# Patient Record
Sex: MALE | Race: WHITE | NOT HISPANIC OR LATINO | Employment: UNEMPLOYED | URBAN - METROPOLITAN AREA
[De-identification: names, ages, dates, MRNs, and addresses within clinical notes are randomized per-mention and may not be internally consistent; named-entity substitution may affect disease eponyms.]

---

## 2021-01-22 ENCOUNTER — APPOINTMENT (OUTPATIENT)
Dept: GENERAL RADIOLOGY | Age: 46
End: 2021-01-22
Attending: NURSE PRACTITIONER

## 2021-01-22 ENCOUNTER — HOSPITAL ENCOUNTER (EMERGENCY)
Age: 46
Discharge: HOME | End: 2021-01-23
Attending: EMERGENCY MEDICINE

## 2021-01-22 DIAGNOSIS — U07.1 COVID-19 VIRUS INFECTION: ICD-10-CM

## 2021-01-22 DIAGNOSIS — U07.1 PNEUMONIA DUE TO COVID-19 VIRUS: Primary | ICD-10-CM

## 2021-01-22 DIAGNOSIS — J12.82 PNEUMONIA DUE TO COVID-19 VIRUS: Primary | ICD-10-CM

## 2021-01-22 LAB
ALBUMIN SERPL-MCNC: 3.2 G/DL (ref 3.6–5.1)
ALBUMIN/GLOB SERPL: 0.6 {RATIO} (ref 1–2.4)
ALP SERPL-CCNC: 83 UNITS/L (ref 45–117)
ALT SERPL-CCNC: 48 UNITS/L
ANION GAP SERPL CALC-SCNC: 13 MMOL/L (ref 10–20)
AST SERPL-CCNC: 37 UNITS/L
BASOPHILS # BLD: 0.1 K/MCL (ref 0–0.3)
BASOPHILS NFR BLD: 1 %
BILIRUB SERPL-MCNC: 0.4 MG/DL (ref 0.2–1)
BUN SERPL-MCNC: 19 MG/DL (ref 6–20)
BUN/CREAT SERPL: 17 (ref 7–25)
CALCIUM SERPL-MCNC: 8.8 MG/DL (ref 8.4–10.2)
CHLORIDE SERPL-SCNC: 106 MMOL/L (ref 98–107)
CO2 SERPL-SCNC: 26 MMOL/L (ref 21–32)
CREAT SERPL-MCNC: 1.13 MG/DL (ref 0.67–1.17)
DEPRECATED RDW RBC: 40 FL (ref 39–50)
EOSINOPHIL # BLD: 0 K/MCL (ref 0–0.5)
EOSINOPHIL NFR BLD: 0 %
ERYTHROCYTE [DISTWIDTH] IN BLOOD: 12.6 % (ref 11–15)
FASTING DURATION TIME PATIENT: ABNORMAL H
GFR SERPLBLD BASED ON 1.73 SQ M-ARVRAT: 78 ML/MIN/1.73M2
GLOBULIN SER-MCNC: 5 G/DL (ref 2–4)
GLUCOSE SERPL-MCNC: 106 MG/DL (ref 65–99)
HCT VFR BLD CALC: 43.6 % (ref 39–51)
HGB BLD-MCNC: 14.9 G/DL (ref 13–17)
IMM GRANULOCYTES # BLD AUTO: 0.4 K/MCL (ref 0–0.2)
IMM GRANULOCYTES # BLD: 4 %
LYMPHOCYTES # BLD: 1.7 K/MCL (ref 1–4.8)
LYMPHOCYTES NFR BLD: 16 %
MCH RBC QN AUTO: 29.8 PG (ref 26–34)
MCHC RBC AUTO-ENTMCNC: 34.2 G/DL (ref 32–36.5)
MCV RBC AUTO: 87.2 FL (ref 78–100)
MONOCYTES # BLD: 0.6 K/MCL (ref 0.3–0.9)
MONOCYTES NFR BLD: 6 %
NEUTROPHILS # BLD: 7.6 K/MCL (ref 1.8–7.7)
NEUTROPHILS NFR BLD: 73 %
NRBC BLD MANUAL-RTO: 0 /100 WBC
PLAT MORPH BLD: NORMAL
PLATELET # BLD AUTO: 275 K/MCL (ref 140–450)
POTASSIUM SERPL-SCNC: 3.9 MMOL/L (ref 3.4–5.1)
PROT SERPL-MCNC: 8.2 G/DL (ref 6.4–8.2)
RBC # BLD: 5 MIL/MCL (ref 4.5–5.9)
RBC MORPH BLD: NORMAL
SARS-COV-2 RNA RESP QL NAA+PROBE: DETECTED
SERVICE CMNT-IMP: ABNORMAL
SODIUM SERPL-SCNC: 141 MMOL/L (ref 135–145)
WBC # BLD: 10.5 K/MCL (ref 4.2–11)

## 2021-01-22 PROCEDURE — C9803 HOPD COVID-19 SPEC COLLECT: HCPCS

## 2021-01-22 PROCEDURE — 10002807 HB RX 258: Performed by: NURSE PRACTITIONER

## 2021-01-22 PROCEDURE — 99285 EMERGENCY DEPT VISIT HI MDM: CPT

## 2021-01-22 PROCEDURE — 71045 X-RAY EXAM CHEST 1 VIEW: CPT

## 2021-01-22 PROCEDURE — 10002803 HB RX 637: Performed by: NURSE PRACTITIONER

## 2021-01-22 PROCEDURE — 85025 COMPLETE CBC W/AUTO DIFF WBC: CPT | Performed by: NURSE PRACTITIONER

## 2021-01-22 PROCEDURE — 10002800 HB RX 250 W HCPCS: Performed by: NURSE PRACTITIONER

## 2021-01-22 PROCEDURE — 10002800 HB RX 250 W HCPCS: Performed by: EMERGENCY MEDICINE

## 2021-01-22 PROCEDURE — 96361 HYDRATE IV INFUSION ADD-ON: CPT

## 2021-01-22 PROCEDURE — 10002807 HB RX 258: Performed by: EMERGENCY MEDICINE

## 2021-01-22 PROCEDURE — 96374 THER/PROPH/DIAG INJ IV PUSH: CPT

## 2021-01-22 PROCEDURE — 80053 COMPREHEN METABOLIC PANEL: CPT | Performed by: NURSE PRACTITIONER

## 2021-01-22 PROCEDURE — M0239 BAMLANIVIMAB-XXXX INFUSION: HCPCS

## 2021-01-22 PROCEDURE — U0003 INFECTIOUS AGENT DETECTION BY NUCLEIC ACID (DNA OR RNA); SEVERE ACUTE RESPIRATORY SYNDROME CORONAVIRUS 2 (SARS-COV-2) (CORONAVIRUS DISEASE [COVID-19]), AMPLIFIED PROBE TECHNIQUE, MAKING USE OF HIGH THROUGHPUT TECHNOLOGIES AS DESCRIBED BY CMS-2020-01-R: HCPCS | Performed by: NURSE PRACTITIONER

## 2021-01-22 RX ORDER — ALBUTEROL SULFATE 90 UG/1
6 AEROSOL, METERED RESPIRATORY (INHALATION) ONCE
Status: COMPLETED | OUTPATIENT
Start: 2021-01-22 | End: 2021-01-22

## 2021-01-22 RX ORDER — 0.9 % SODIUM CHLORIDE 0.9 %
2 VIAL (ML) INJECTION EVERY 12 HOURS SCHEDULED
Status: DISCONTINUED | OUTPATIENT
Start: 2021-01-22 | End: 2021-01-23 | Stop reason: HOSPADM

## 2021-01-22 RX ORDER — ACETAMINOPHEN 325 MG/1
650 TABLET ORAL EVERY 4 HOURS PRN
Qty: 20 TABLET | Refills: 0 | Status: SHIPPED
Start: 2021-01-22 | End: 2021-01-25

## 2021-01-22 RX ORDER — ONDANSETRON 4 MG/1
4 TABLET, FILM COATED ORAL EVERY 8 HOURS PRN
Qty: 10 TABLET | Refills: 0 | Status: SHIPPED
Start: 2021-01-22 | End: 2021-01-25

## 2021-01-22 RX ORDER — DEXAMETHASONE SODIUM PHOSPHATE 10 MG/ML
10 INJECTION, SOLUTION INTRAMUSCULAR; INTRAVENOUS ONCE
Status: COMPLETED | OUTPATIENT
Start: 2021-01-22 | End: 2021-01-22

## 2021-01-22 RX ORDER — ACETAMINOPHEN 325 MG/1
650 TABLET ORAL
Status: DISCONTINUED | OUTPATIENT
Start: 2021-01-22 | End: 2021-01-23 | Stop reason: HOSPADM

## 2021-01-22 RX ORDER — LOPERAMIDE HYDROCHLORIDE 2 MG/1
2-4 TABLET ORAL 4 TIMES DAILY PRN
Qty: 10 TABLET | Refills: 0 | Status: SHIPPED
Start: 2021-01-22 | End: 2021-01-24

## 2021-01-22 RX ADMIN — DEXAMETHASONE SODIUM PHOSPHATE 10 MG: 10 INJECTION, SOLUTION INTRAMUSCULAR; INTRAVENOUS at 20:20

## 2021-01-22 RX ADMIN — SODIUM CHLORIDE 700 MG: 0.9 INJECTION, SOLUTION INTRAVENOUS at 22:27

## 2021-01-22 RX ADMIN — SODIUM CHLORIDE 1000 ML: 0.9 INJECTION, SOLUTION INTRAVENOUS at 20:23

## 2021-01-22 RX ADMIN — ALBUTEROL SULFATE 6 PUFF: 90 AEROSOL, METERED RESPIRATORY (INHALATION) at 20:33

## 2021-01-22 ASSESSMENT — ENCOUNTER SYMPTOMS
PSYCHIATRIC NEGATIVE: 1
FEVER: 1
CHILLS: 1
NEUROLOGICAL NEGATIVE: 1
COUGH: 1
GASTROINTESTINAL NEGATIVE: 1
SHORTNESS OF BREATH: 1

## 2021-01-22 ASSESSMENT — PAIN SCALES - GENERAL: PAINLEVEL_OUTOF10: 0

## 2021-01-23 VITALS
HEIGHT: 70 IN | TEMPERATURE: 99.1 F | WEIGHT: 247.8 LBS | SYSTOLIC BLOOD PRESSURE: 123 MMHG | RESPIRATION RATE: 22 BRPM | HEART RATE: 81 BPM | BODY MASS INDEX: 35.48 KG/M2 | DIASTOLIC BLOOD PRESSURE: 64 MMHG | OXYGEN SATURATION: 95 %

## 2021-01-23 LAB
RAINBOW EXTRA TUBES HOLD SPECIMEN: NORMAL

## 2021-01-25 ENCOUNTER — TELEPHONE (OUTPATIENT)
Dept: SCHEDULING | Age: 46
End: 2021-01-25

## 2021-01-26 ENCOUNTER — TELEPHONE (OUTPATIENT)
Dept: SCHEDULING | Age: 46
End: 2021-01-26

## 2023-09-03 PROBLEM — F51.02 ADJUSTMENT INSOMNIA: Status: ACTIVE | Noted: 2023-09-03

## 2023-09-03 PROBLEM — M25.579 ANKLE JOINT PAIN: Status: ACTIVE | Noted: 2023-09-03

## 2023-09-03 PROBLEM — F43.23 ADJUSTMENT DISORDER WITH MIXED ANXIETY AND DEPRESSED MOOD: Status: ACTIVE | Noted: 2023-09-03

## 2023-09-03 PROBLEM — I48.0 PAROXYSMAL ATRIAL FIBRILLATION (MULTI): Status: ACTIVE | Noted: 2023-09-03

## 2023-09-03 PROBLEM — E78.00 HIGH CHOLESTEROL: Status: ACTIVE | Noted: 2019-02-04

## 2023-09-03 PROBLEM — S93.401S: Status: ACTIVE | Noted: 2023-09-03

## 2023-09-03 PROBLEM — I10 ESSENTIAL (PRIMARY) HYPERTENSION: Status: ACTIVE | Noted: 2019-02-04

## 2023-09-03 PROBLEM — G43.909 MIGRAINES: Status: ACTIVE | Noted: 2019-02-11

## 2023-09-03 PROBLEM — E78.5 HYPERLIPEMIA: Status: ACTIVE | Noted: 2023-09-03

## 2023-09-03 PROBLEM — E87.5 HYPERKALEMIA: Status: ACTIVE | Noted: 2019-02-11

## 2023-09-03 PROBLEM — S93.601A SPRAIN OF RIGHT FOOT: Status: ACTIVE | Noted: 2023-09-03

## 2023-09-03 PROBLEM — S82.63XA CLOSED FRACTURE OF LATERAL MALLEOLUS: Status: ACTIVE | Noted: 2023-09-03

## 2023-09-03 RX ORDER — HYDROCHLOROTHIAZIDE 25 MG/1
1 TABLET ORAL DAILY
COMMUNITY
Start: 2022-01-31 | End: 2024-06-04

## 2023-09-03 RX ORDER — RIVAROXABAN 20 MG/1
1 TABLET, FILM COATED ORAL DAILY
COMMUNITY
Start: 2022-06-07 | End: 2024-06-04

## 2023-09-03 RX ORDER — VERAPAMIL HYDROCHLORIDE 360 MG/1
1 CAPSULE, DELAYED RELEASE PELLETS ORAL DAILY
COMMUNITY
Start: 2014-02-05 | End: 2024-06-04

## 2023-09-03 RX ORDER — METOPROLOL SUCCINATE 25 MG/1
1 TABLET, EXTENDED RELEASE ORAL DAILY
COMMUNITY
Start: 2022-04-29 | End: 2024-06-04

## 2023-09-03 RX ORDER — AMLODIPINE BESYLATE 10 MG/1
1 TABLET ORAL DAILY
COMMUNITY
Start: 2022-04-29 | End: 2023-11-13 | Stop reason: ALTCHOICE

## 2023-10-24 ENCOUNTER — OFFICE VISIT (OUTPATIENT)
Dept: DERMATOLOGY | Facility: CLINIC | Age: 48
End: 2023-10-24
Payer: COMMERCIAL

## 2023-10-24 DIAGNOSIS — L82.1 SEBORRHEIC KERATOSIS: Primary | ICD-10-CM

## 2023-10-24 DIAGNOSIS — L57.8 DIFFUSE PHOTODAMAGE OF SKIN: ICD-10-CM

## 2023-10-24 PROCEDURE — 99203 OFFICE O/P NEW LOW 30 MIN: CPT | Performed by: STUDENT IN AN ORGANIZED HEALTH CARE EDUCATION/TRAINING PROGRAM

## 2023-10-24 NOTE — PROGRESS NOTES
Subjective     Tank Neri is a 48 y.o. male who presents for the following: Suspicious Skin Lesion (Bilat. Posterior Arms. Hx of Melanoma Maternal Grandfather.).     Review of Systems:  No other skin or systemic complaints other than what is documented elsewhere in the note.    The following portions of the chart were reviewed this encounter and updated as appropriate:          Skin Cancer History  No skin cancer on file.      Specialty Problems    None       Objective   Well appearing patient in no apparent distress; mood and affect are within normal limits.    A focused skin examination was performed. All findings within normal limits unless otherwise noted below.    Assessment/Plan   1. Seborrheic keratosis (2)  Left Upper Arm - Anterior, Right Upper Arm - Anterior  Stuck on verrucous, tan-brown papules and plaques.      Reassured, benign    2. Diffuse photodamage of skin  Head - Anterior (Face)    Over the counter use of sun screen product reviewed

## 2023-11-13 ENCOUNTER — OFFICE VISIT (OUTPATIENT)
Dept: CARDIOLOGY | Facility: CLINIC | Age: 48
End: 2023-11-13
Payer: COMMERCIAL

## 2023-11-13 VITALS
WEIGHT: 229 LBS | DIASTOLIC BLOOD PRESSURE: 119 MMHG | OXYGEN SATURATION: 95 % | HEIGHT: 72 IN | BODY MASS INDEX: 31.02 KG/M2 | HEART RATE: 84 BPM | SYSTOLIC BLOOD PRESSURE: 165 MMHG

## 2023-11-13 DIAGNOSIS — I10 ESSENTIAL (PRIMARY) HYPERTENSION: ICD-10-CM

## 2023-11-13 DIAGNOSIS — I48.0 PAROXYSMAL ATRIAL FIBRILLATION (MULTI): ICD-10-CM

## 2023-11-13 DIAGNOSIS — E78.2 MIXED HYPERLIPIDEMIA: ICD-10-CM

## 2023-11-13 DIAGNOSIS — R07.89 ATYPICAL CHEST PAIN: Primary | ICD-10-CM

## 2023-11-13 PROCEDURE — 3080F DIAST BP >= 90 MM HG: CPT | Performed by: INTERNAL MEDICINE

## 2023-11-13 PROCEDURE — 99215 OFFICE O/P EST HI 40 MIN: CPT | Performed by: INTERNAL MEDICINE

## 2023-11-13 PROCEDURE — 3077F SYST BP >= 140 MM HG: CPT | Performed by: INTERNAL MEDICINE

## 2023-11-13 PROCEDURE — 93005 ELECTROCARDIOGRAM TRACING: CPT | Performed by: INTERNAL MEDICINE

## 2023-11-13 PROCEDURE — 1036F TOBACCO NON-USER: CPT | Performed by: INTERNAL MEDICINE

## 2023-11-13 PROCEDURE — 93010 ELECTROCARDIOGRAM REPORT: CPT | Performed by: INTERNAL MEDICINE

## 2023-11-13 RX ORDER — NITROGLYCERIN 0.4 MG/1
0.4 TABLET SUBLINGUAL EVERY 5 MIN PRN
Qty: 100 TABLET | Refills: 11 | Status: SHIPPED | OUTPATIENT
Start: 2023-11-13 | End: 2024-11-12

## 2023-11-13 RX ORDER — REGADENOSON 0.08 MG/ML
0.4 INJECTION, SOLUTION INTRAVENOUS
OUTPATIENT
Start: 2023-11-13

## 2023-11-13 ASSESSMENT — ENCOUNTER SYMPTOMS
LOSS OF SENSATION IN FEET: 0
DEPRESSION: 0
OCCASIONAL FEELINGS OF UNSTEADINESS: 0

## 2023-11-13 ASSESSMENT — PAIN SCALES - GENERAL: PAINLEVEL: 0-NO PAIN

## 2023-11-13 NOTE — PROGRESS NOTES
Chief Complaint:   Follow-up (5-6 month)     History Of Present Illness:    Tank Neri is a 48 y.o. male presenting with  a pertient medical Hx notable for adjustment insomnia, dyslipidemia, hypertension, paroxysmal atrial fibrillation who presents to cardiology for follow up care.     Recall that he had been in his usual state of health. He was getting ready for work when he began to experience intermittent heart palpitations associated with this was a fogginess to his brain, he went to work, where his supervisor felt that he is unwell. EMS was summoned was noted to have hypertensive urgency  For his atrial fibrillation. He was subsequently referred to [facility] were seen in the ER. He received IV labetalol for both blood pressure and rate control. He subsequently converted to normal sinus rhythm and maintained rhythm. He was started on apixaban 5 mg twice daily. He was continued on verapamil 360 mg daily, hydrochlorothiazide 25 mg, spironolactone 25 mg. He was discharged with event monitor..      Since his discharge, he has been doing well/notes of blood pressure has been better controlled. He notes a heart palpitations has also been better controlled. He has reduced and limiting nicotine and caffeine use which she feels has been helpful for him. He voices no additional complaints     Today ( 11/18/2022) he returns for follow up. He has just returned from a family vacation in [facility]. He was noticing that his legs have had a little more irritation and burn to them. Because of symptoms, he stopped amlodipine he feels that maybe his symptoms are related to amlodipine as he thinks the pill became bigger and changed. After this he began to have some of his discomfort.      5/18/2023 -- He returns for follow up. He has not had significant heart issues since the last seen.    11/13 /2023    He returns for follow up. He has been havig some chest discomfor that can occur at rest or exertion.   He has  been intermittently compliant with his blood pressure medications.        Last Recorded Vitals:  Vitals:    11/13/23 1019   BP: (!) 165/119   Patient Position: Sitting   Pulse: 84   SpO2: 95%   Weight: 104 kg (229 lb)   Height: 1.829 m (6')       Past Medical History:  He has no past medical history on file.    Past Surgical History:  He has a past surgical history that includes Cardiac catheterization (06/25/2013) and Other surgical history (06/25/2013).      Social History:  He reports that he has quit smoking. His smoking use included cigarettes. He has never used smokeless tobacco. No history on file for alcohol use and drug use.    Family History:  No family history on file.     Allergies:  Lisinopril    Outpatient Medications:  Current Outpatient Medications   Medication Instructions    hydroCHLOROthiazide (HYDRODiuril) 25 mg tablet 1 tablet, oral, Daily    metoprolol succinate XL (Toprol-XL) 25 mg 24 hr tablet 1 tablet, oral, Daily    nitroglycerin (NITROSTAT) 0.4 mg, sublingual, Every 5 min PRN, May repeat dose every 5 minutes for up to 3 doses total.    verapamil ER (Veralan PM) 360 mg 24 hr capsule 1 capsule, oral, Daily    Xarelto 20 mg tablet 1 tablet, oral, Daily       Physical Exam:  BP (!) 165/119 (Patient Position: Sitting)   Pulse 84   Ht 1.829 m (6')   Wt 104 kg (229 lb)   SpO2 95%   BMI 31.06 kg/m²   BP (!) 165/119 (Patient Position: Sitting)   Pulse 84   Ht 1.829 m (6')   Wt 104 kg (229 lb)   SpO2 95%   BMI 31.06 kg/m²        General Appearance:  Alert, cooperative, no distress, appears stated age   Head:  Normocephalic, without obvious abnormality, atraumatic   Ears:  Normal TM's and external ear canals, both ears   Throat: Lips, mucosa, and tongue normal; teeth and gums normal   Back:   Symmetric, no curvature, ROM normal, no CVA tenderness   Lungs:   Clear to auscultation bilaterally, respirations unlabored   Chest Wall:  No tenderness or deformity   Heart:  Regular rate and rhythm,  S1, S2 normal, no murmur, rub or gallop   Abdomen:   Soft, non-tender, bowel sounds active all four quadrants,  no masses, no organomegaly   Extremities: Extremities normal, atraumatic, no cyanosis or edema   Pulses: 2+ and symmetric   Skin: Skin color, texture, turgor normal, no rashes or lesions   Lymph nodes: Cervical, supraclavicular, and axillary nodes normal   Neurologic: Normal     BC -  Lab Results   Component Value Date    WBC 6.9 04/13/2022    HGB 16.7 04/13/2022    HCT 49.6 04/13/2022    MCV 91 04/13/2022     04/13/2022       CMP -  Lab Results   Component Value Date    CALCIUM 9.8 04/13/2022    PROT 7.9 04/13/2022    ALBUMIN 4.4 04/13/2022    ALBUMIN 4.3 02/08/2019    AST 25 04/13/2022    ALT 20 04/13/2022    ALKPHOS 60 04/13/2022    BILITOT 0.8 04/13/2022       LIPID PANEL -   Lab Results   Component Value Date    CHOL 190 02/08/2019    TRIG 80 02/08/2019    HDL 47 02/08/2019    CHHDL 4.0 02/08/2019       RENAL FUNCTION PANEL -   Lab Results   Component Value Date    GLUCOSE 105 (H) 04/13/2022     04/13/2022    K 3.8 04/13/2022     04/13/2022    CO2 26 04/13/2022    ANIONGAP 14 04/13/2022    BUN 16 04/13/2022    CREATININE 1.02 04/13/2022    GFRMALE >90 04/13/2022    CALCIUM 9.8 04/13/2022    ALBUMIN 4.4 04/13/2022    ALBUMIN 4.3 02/08/2019        Lab Results   Component Value Date    BNP 13 04/13/2022    HGBA1C 5.1 02/08/2019       Last Cardiology Tests:  ECG:  In office EKG  -- Sinus rhythm 71 bpm  -- Q waves present V1 through V3     Event monitor 04/2022  Portal Profes 14 day holter monitor    rhythm sinus bradycardia to sinus tachycardia with occurrence of first-degree AV block.  Minimum heart rate 57, maximum heart rate 132, average heart rate 77  954 PVCs burden 0.07%, 70 2P SVC's burden of less than 0.01%, 16 patient reported events correlating with sinus rhythm no additional atrial fibrillation seen.         Lab review: I have personally reviewed the laboratory result(s)  "  Diagnostic review: I have personally reviewed the result(s) of the EKG and Echocardiogram .     Assessment/Plan   Problem List Items Addressed This Visit       Essential (primary) hypertension    Overview     He has documented angioedema to ACE inhibitors  He had myalgia related to use of Amlodipine-- Stopped             Current Assessment & Plan     hydrochlorothiazide 25  metoprolol succinate 25  verapamil 320         Hyperlipemia    Overview     The ASCVD Risk score (Raheem HUI, et al., 2019) failed to calculate for the following reasons:    Cannot find a previous HDL lab    Cannot find a previous total cholesterol lab  Lab Results   Component Value Date    CHOL 190 02/08/2019     Lab Results   Component Value Date    HDL 47 02/08/2019     Lab Results   Component Value Date    LDLCALC 127 02/08/2019     Lab Results   Component Value Date    TRIG 80 02/08/2019   No components found for: \"CHOLHDL\"           Current Assessment & Plan     Repeat Blood Work for risk stratification            Relevant Orders    ECG 12 lead (Clinic Performed) (Completed)    Lipid Panel    Comprehensive metabolic panel    CBC and Auto Differential    Paroxysmal atrial fibrillation (CMS/HCC)    Overview      [id number] score =1 ( HTN)   --Continue Apixaban at this time   -- Continue Verapamil / Metoprolol for Rate / Rhythm control         Relevant Medications    nitroglycerin (Nitrostat) 0.4 mg SL tablet    Other Relevant Orders    ECG 12 lead (Clinic Performed) (Completed)    Comprehensive metabolic panel    CBC and Auto Differential    Atypical chest pain - Primary    Overview     Atypical Chest pain with New Q waves in the Septal Leads  Re-stratifiy for ischemic Disease   Nuclear Perfusion Stress Test         Relevant Medications    nitroglycerin (Nitrostat) 0.4 mg SL tablet    Other Relevant Orders    ECG 12 lead (Clinic Performed) (Completed)    Lipid Panel    Nuclear Stress Test    Comprehensive metabolic panel    CBC and Auto " Differential           Charanjit Palmer, DO

## 2023-11-15 LAB
ATRIAL RATE: 84 BPM
P AXIS: 27 DEGREES
P OFFSET: 185 MS
P ONSET: 132 MS
PR INTERVAL: 180 MS
Q ONSET: 222 MS
QRS COUNT: 14 BEATS
QRS DURATION: 84 MS
QT INTERVAL: 372 MS
QTC CALCULATION(BAZETT): 439 MS
QTC FREDERICIA: 416 MS
R AXIS: -21 DEGREES
T AXIS: 63 DEGREES
T OFFSET: 408 MS
VENTRICULAR RATE: 84 BPM

## 2023-11-30 PROBLEM — R07.89 ATYPICAL CHEST PAIN: Status: ACTIVE | Noted: 2023-11-30

## 2023-12-01 ENCOUNTER — APPOINTMENT (OUTPATIENT)
Dept: CARDIOLOGY | Facility: HOSPITAL | Age: 48
End: 2023-12-01
Payer: COMMERCIAL

## 2023-12-01 ENCOUNTER — APPOINTMENT (OUTPATIENT)
Dept: RADIOLOGY | Facility: HOSPITAL | Age: 48
End: 2023-12-01
Payer: COMMERCIAL

## 2023-12-11 ENCOUNTER — APPOINTMENT (OUTPATIENT)
Dept: CARDIOLOGY | Facility: CLINIC | Age: 48
End: 2023-12-11
Payer: COMMERCIAL

## 2023-12-13 ENCOUNTER — TELEPHONE (OUTPATIENT)
Dept: CARDIOLOGY | Facility: CLINIC | Age: 48
End: 2023-12-13
Payer: COMMERCIAL

## 2023-12-13 NOTE — TELEPHONE ENCOUNTER
Patient calling regarding nuclear stress test. Patient states that his insurance company ([email or web address]) has reached out to the office twice 11/20 and 11/21 regarding a peer to peer review to get his nuclear stress test approved.    Patient also states that paperwork has been faxed over and not been received back.    Patient had to cancel first stress test and is scheduled for tomorrow, but test is still not yet approved.    Patient wants to know if paperwork has been received or has peer to peer been scheduled?

## 2023-12-15 ENCOUNTER — APPOINTMENT (OUTPATIENT)
Dept: OPHTHALMOLOGY | Facility: CLINIC | Age: 48
End: 2023-12-15
Payer: COMMERCIAL

## 2023-12-22 ENCOUNTER — APPOINTMENT (OUTPATIENT)
Dept: CARDIOLOGY | Facility: CLINIC | Age: 48
End: 2023-12-22
Payer: COMMERCIAL

## 2024-05-06 ENCOUNTER — OFFICE VISIT (OUTPATIENT)
Dept: OPHTHALMOLOGY | Facility: CLINIC | Age: 49
End: 2024-05-06
Payer: COMMERCIAL

## 2024-05-06 DIAGNOSIS — H02.9 EYELID LESION, BENIGN: ICD-10-CM

## 2024-05-06 DIAGNOSIS — H25.813 COMBINED FORMS OF AGE-RELATED CATARACT OF BOTH EYES: Primary | ICD-10-CM

## 2024-05-06 DIAGNOSIS — H52.7 REFRACTIVE ERROR: ICD-10-CM

## 2024-05-06 PROCEDURE — 99214 OFFICE O/P EST MOD 30 MIN: CPT | Performed by: OPHTHALMOLOGY

## 2024-05-06 PROCEDURE — 92015 DETERMINE REFRACTIVE STATE: CPT | Performed by: OPHTHALMOLOGY

## 2024-05-06 PROCEDURE — 99204 OFFICE O/P NEW MOD 45 MIN: CPT | Performed by: OPHTHALMOLOGY

## 2024-05-06 RX ORDER — POLYMYXIN B SULFATE AND TRIMETHOPRIM 1; 10000 MG/ML; [USP'U]/ML
SOLUTION OPHTHALMIC
COMMUNITY
Start: 2024-04-29

## 2024-05-06 ASSESSMENT — REFRACTION_WEARINGRX
OD_CYLINDER: -0.50
OS_CYLINDER: -0.25
OS_AXIS: 021
OD_AXIS: 160
OD_SPHERE: -3.00
OS_SPHERE: -3.25

## 2024-05-06 ASSESSMENT — CUP TO DISC RATIO
OD_RATIO: 0.35
OS_RATIO: 0.35

## 2024-05-06 ASSESSMENT — ENCOUNTER SYMPTOMS
OCCASIONAL FEELINGS OF UNSTEADINESS: 0
NEUROLOGICAL NEGATIVE: 0
ALLERGIC/IMMUNOLOGIC NEGATIVE: 0
PSYCHIATRIC NEGATIVE: 0
RESPIRATORY NEGATIVE: 0
LOSS OF SENSATION IN FEET: 0
EYES NEGATIVE: 0
HEMATOLOGIC/LYMPHATIC NEGATIVE: 0
DEPRESSION: 0
MUSCULOSKELETAL NEGATIVE: 0
CARDIOVASCULAR NEGATIVE: 0
CONSTITUTIONAL NEGATIVE: 0
ENDOCRINE NEGATIVE: 0
GASTROINTESTINAL NEGATIVE: 0

## 2024-05-06 ASSESSMENT — KERATOMETRY
OD_AXISANGLE2_DEGREES: 140
OD_K2POWER_DIOPTERS: 45.25
OS_AXISANGLE2_DEGREES: 30
OS_AXISANGLE_DEGREES: 120
OS_K2POWER_DIOPTERS: 45.75
OD_K1POWER_DIOPTERS: 44.25
OD_AXISANGLE_DEGREES: 50
OS_K1POWER_DIOPTERS: 44.75

## 2024-05-06 ASSESSMENT — PATIENT HEALTH QUESTIONNAIRE - PHQ9
SUM OF ALL RESPONSES TO PHQ9 QUESTIONS 1 AND 2: 0
2. FEELING DOWN, DEPRESSED OR HOPELESS: NOT AT ALL
1. LITTLE INTEREST OR PLEASURE IN DOING THINGS: NOT AT ALL

## 2024-05-06 ASSESSMENT — VISUAL ACUITY
METHOD: SNELLEN - LINEAR
OS_CC: 20/25
OD_CC: 20/25
OD_CC+: -2
OS_CC+: -2
CORRECTION_TYPE: GLASSES

## 2024-05-06 ASSESSMENT — TONOMETRY
IOP_METHOD: GOLDMANN APPLANATION
OD_IOP_MMHG: 13
OS_IOP_MMHG: 13

## 2024-05-06 ASSESSMENT — SLIT LAMP EXAM - LIDS: COMMENTS: 1+ BLEPHARITIS, 1+ DERMATOCHALASIS - UPPER LID

## 2024-05-06 ASSESSMENT — EXTERNAL EXAM - LEFT EYE: OS_EXAM: BROW PTOSIS

## 2024-05-06 ASSESSMENT — PAIN SCALES - GENERAL: PAINLEVEL: 0-NO PAIN

## 2024-05-06 ASSESSMENT — EXTERNAL EXAM - RIGHT EYE: OD_EXAM: BROW PTOSIS

## 2024-05-06 NOTE — PROGRESS NOTES
Subjective   Patient ID: Tank Neri is a 49 y.o. male.    Chief Complaint    Annual Exam       HPI    PT WAS SEEN [facility] POSSIBLE STYE.  PT USING POLYTRIM GTT left eye (OS)   Last edited by LAINA Wilson on 5/6/2024  3:21 PM.        Current Outpatient Medications (Ophthalmology pharm classes)   Medication Sig Dispense Refill    polymyxin B sulf-trimethoprim (Polytrim) ophthalmic solution INSTILL 2 DROPS IN AFFECTED EYE EVERY 6 HOURS FOR 7 DAYS       Current Outpatient Medications (Other)   Medication Sig Dispense Refill    hydroCHLOROthiazide (HYDRODiuril) 25 mg tablet Take 1 tablet (25 mg) by mouth once daily.      metoprolol succinate XL (Toprol-XL) 25 mg 24 hr tablet Take 1 tablet (25 mg) by mouth once daily.      nitroglycerin (Nitrostat) 0.4 mg SL tablet Place 1 tablet (0.4 mg) under the tongue every 5 minutes if needed for chest pain. May repeat dose every 5 minutes for up to 3 doses total. 100 tablet 11    verapamil ER (Veralan PM) 360 mg 24 hr capsule Take 1 capsule (360 mg) by mouth once daily.      Xarelto 20 mg tablet Take 1 tablet (20 mg) by mouth once daily.         Objective   Base Eye Exam       Visual Acuity (Snellen - Linear)         Right Left Both    Dist cc 20/25 -2 20/25 -2     Near cc   J1      Correction: Glasses              Tonometry (Goldmann Applanation, 3:37 PM)         Right Left    Pressure 13 13              Pupils         Dark Shape React APD    Right 5 Round 2 None    Left 5 Round 2 None              Extraocular Movement         Right Left     Full Full              Dilation       Both eyes: 1% Tropic 2.5% Phen @ 3:38 PM                  Additional Tests       Keratometry         K1 Axis K2 Axis    Right 44.25 140 45.25 50    Left 44.75 30 45.75 120                  Slit Lamp and Fundus Exam       External Exam         Right Left    External Brow ptosis Brow ptosis              Slit Lamp Exam         Right Left    Lids/Lashes 1+ Blepharitis, 1+  Dermatochalasis - upper lid 1+ Blepharitis, 1+ Dermatochalasis - upper lid; small cyst temporally on lower lid.          Conjunctiva/Sclera normal bulbar and palepbral conjunctiva normal bulbar and palepbral conjunctiva    Cornea normal epi/stroma/endo and tear film normal epi/stroma/endo and tear film    Anterior Chamber normal anterior chamber, deep and quiet normal anterior chamber, deep and quiet    Iris pupil miotic pupil miotic    Lens Trace nuclear sclerosis Trace nuclear sclerosis    Anterior Vitreous vitreous clear and normal vitreous clear and normal              Fundus Exam         Right Left    Disc normal optic nerve normal optic nerve    C/D Ratio 0.35 0.35    Macula normal macula normal macula    Vessels normal retinal vessels normal retinal vessels    Periphery normal retinal periphery normal retinal periphery                  Refraction       Wearing Rx         Sphere Cylinder Axis    Right -3.00 -0.50 160    Left -3.25 -0.25 021              Final Rx         Sphere Cylinder [place] Dist VA Add Near VA    Right -3.00 -0.50 160 20/20 +1.50 20/20    Left -3.25 -0.25 020 20/20 +1.50 20/20      Expiration Date: 5/6/2026    Pupillary Distance: 64                    Assessment/Plan   Problem List Items Addressed This Visit          Eye/Vision problems    Combined forms of age-related cataract of both eyes - Primary    Refractive error     Rx given.  F/u 2 years full.           Eyelid lesion, benign

## 2024-05-06 NOTE — PATIENT INSTRUCTIONS
Lid scrubs daily.  Artificial tears as needed.  Glasses prescription given.  Follow up in 2 years for a full exam.

## 2024-05-10 ENCOUNTER — APPOINTMENT (OUTPATIENT)
Dept: OPHTHALMOLOGY | Facility: CLINIC | Age: 49
End: 2024-05-10
Payer: COMMERCIAL

## 2024-06-02 DIAGNOSIS — I48.0 PAROXYSMAL ATRIAL FIBRILLATION (MULTI): ICD-10-CM

## 2024-06-02 DIAGNOSIS — I10 ESSENTIAL (PRIMARY) HYPERTENSION: ICD-10-CM

## 2024-06-02 DIAGNOSIS — R07.89 ATYPICAL CHEST PAIN: Primary | ICD-10-CM

## 2024-06-04 RX ORDER — VERAPAMIL HYDROCHLORIDE 360 MG/1
360 CAPSULE, DELAYED RELEASE PELLETS ORAL DAILY
Qty: 90 CAPSULE | Refills: 3 | Status: SHIPPED | OUTPATIENT
Start: 2024-06-04

## 2024-06-04 RX ORDER — HYDROCHLOROTHIAZIDE 25 MG/1
25 TABLET ORAL DAILY
Qty: 90 TABLET | Refills: 3 | Status: SHIPPED | OUTPATIENT
Start: 2024-06-04 | End: 2025-06-04

## 2024-06-04 RX ORDER — METOPROLOL SUCCINATE 25 MG/1
25 TABLET, EXTENDED RELEASE ORAL DAILY
Qty: 90 TABLET | Refills: 3 | Status: SHIPPED | OUTPATIENT
Start: 2024-06-04 | End: 2025-06-04

## 2024-07-19 ENCOUNTER — TELEPHONE (OUTPATIENT)
Dept: FAMILY MEDICINE | Age: 49
End: 2024-07-19

## 2024-07-19 ENCOUNTER — APPOINTMENT (OUTPATIENT)
Dept: FAMILY MEDICINE | Age: 49
End: 2024-07-19

## 2024-07-19 VITALS
TEMPERATURE: 98.4 F | HEART RATE: 71 BPM | OXYGEN SATURATION: 99 % | BODY MASS INDEX: 41.19 KG/M2 | DIASTOLIC BLOOD PRESSURE: 90 MMHG | WEIGHT: 287.7 LBS | HEIGHT: 70 IN | SYSTOLIC BLOOD PRESSURE: 140 MMHG

## 2024-07-19 DIAGNOSIS — E66.01 CLASS 3 SEVERE OBESITY DUE TO EXCESS CALORIES WITHOUT SERIOUS COMORBIDITY WITH BODY MASS INDEX (BMI) OF 40.0 TO 44.9 IN ADULT  (CMD): ICD-10-CM

## 2024-07-19 DIAGNOSIS — Z00.00 ENCOUNTER FOR GENERAL ADULT MEDICAL EXAMINATION W/O ABNORMAL FINDINGS: Primary | ICD-10-CM

## 2024-07-19 DIAGNOSIS — K21.9 GASTROESOPHAGEAL REFLUX DISEASE WITHOUT ESOPHAGITIS: ICD-10-CM

## 2024-07-19 DIAGNOSIS — Z76.89 ENCOUNTER TO ESTABLISH CARE WITH NEW DOCTOR: ICD-10-CM

## 2024-07-19 DIAGNOSIS — I10 ESSENTIAL HYPERTENSION: ICD-10-CM

## 2024-07-19 DIAGNOSIS — Z12.11 SCREEN FOR COLON CANCER: ICD-10-CM

## 2024-07-19 DIAGNOSIS — Z23 NEED FOR VACCINATION: ICD-10-CM

## 2024-07-19 PROBLEM — E66.813 CLASS 3 SEVERE OBESITY DUE TO EXCESS CALORIES WITHOUT SERIOUS COMORBIDITY WITH BODY MASS INDEX (BMI) OF 40.0 TO 44.9 IN ADULT (CMD): Status: ACTIVE | Noted: 2024-07-19

## 2024-07-19 RX ORDER — IRBESARTAN 75 MG/1
75 TABLET ORAL DAILY
COMMUNITY
Start: 2024-02-26 | End: 2024-07-19 | Stop reason: SDUPTHER

## 2024-07-19 RX ORDER — IRBESARTAN 75 MG/1
75 TABLET ORAL DAILY
Qty: 60 TABLET | Refills: 0 | Status: SHIPPED | OUTPATIENT
Start: 2024-07-19 | End: 2024-09-17

## 2024-07-19 RX ORDER — PANTOPRAZOLE SODIUM 40 MG/1
40 TABLET, DELAYED RELEASE ORAL DAILY
Qty: 90 TABLET | Refills: 1 | Status: SHIPPED | OUTPATIENT
Start: 2024-07-19 | End: 2025-01-15

## 2024-07-19 RX ORDER — PANTOPRAZOLE SODIUM 40 MG/1
40 TABLET, DELAYED RELEASE ORAL DAILY
COMMUNITY
Start: 2024-02-26 | End: 2024-07-19 | Stop reason: SDUPTHER

## 2024-07-19 ASSESSMENT — PATIENT HEALTH QUESTIONNAIRE - PHQ9
SUM OF ALL RESPONSES TO PHQ9 QUESTIONS 1 AND 2: 0
CLINICAL INTERPRETATION OF PHQ2 SCORE: NO FURTHER SCREENING NEEDED
1. LITTLE INTEREST OR PLEASURE IN DOING THINGS: NOT AT ALL
SUM OF ALL RESPONSES TO PHQ9 QUESTIONS 1 AND 2: 0
2. FEELING DOWN, DEPRESSED OR HOPELESS: NOT AT ALL

## 2024-07-20 LAB
ALBUMIN SERPL-MCNC: 4.1 G/DL (ref 3.6–5.1)
ALBUMIN/GLOB SERPL: 1.3 {RATIO} (ref 1–2.4)
ALP SERPL-CCNC: 76 UNITS/L (ref 45–117)
ALT SERPL-CCNC: 83 UNITS/L
ANION GAP SERPL CALC-SCNC: 15 MMOL/L (ref 7–19)
AST SERPL-CCNC: 39 UNITS/L
BASOPHILS # BLD: 0.1 K/MCL (ref 0–0.3)
BASOPHILS NFR BLD: 1 %
BILIRUB SERPL-MCNC: 0.6 MG/DL (ref 0.2–1)
BUN SERPL-MCNC: 17 MG/DL (ref 6–20)
BUN/CREAT SERPL: 16 (ref 7–25)
CALCIUM SERPL-MCNC: 9.1 MG/DL (ref 8.4–10.2)
CHLORIDE SERPL-SCNC: 108 MMOL/L (ref 97–110)
CHOLEST SERPL-MCNC: 178 MG/DL
CHOLEST/HDLC SERPL: 4.5 {RATIO}
CO2 SERPL-SCNC: 22 MMOL/L (ref 21–32)
CREAT SERPL-MCNC: 1.07 MG/DL (ref 0.67–1.17)
DEPRECATED RDW RBC: 43.2 FL (ref 39–50)
EGFRCR SERPLBLD CKD-EPI 2021: 85 ML/MIN/{1.73_M2}
EOSINOPHIL # BLD: 0.3 K/MCL (ref 0–0.5)
EOSINOPHIL NFR BLD: 3 %
ERYTHROCYTE [DISTWIDTH] IN BLOOD: 13.5 % (ref 11–15)
FASTING DURATION TIME PATIENT: 8 HOURS (ref 0–999)
GLOBULIN SER-MCNC: 3.2 G/DL (ref 2–4)
GLUCOSE SERPL-MCNC: 125 MG/DL (ref 70–99)
HBA1C MFR BLD: 5.4 % (ref 4.5–5.6)
HCT VFR BLD CALC: 43 % (ref 39–51)
HCV AB SER QL: NEGATIVE
HDLC SERPL-MCNC: 40 MG/DL
HGB BLD-MCNC: 15 G/DL (ref 13–17)
IMM GRANULOCYTES # BLD AUTO: 0.2 K/MCL (ref 0–0.2)
IMM GRANULOCYTES # BLD: 3 %
LDLC SERPL CALC-MCNC: 95 MG/DL
LYMPHOCYTES # BLD: 2 K/MCL (ref 1–4.8)
LYMPHOCYTES NFR BLD: 24 %
MCH RBC QN AUTO: 30.8 PG (ref 26–34)
MCHC RBC AUTO-ENTMCNC: 34.9 G/DL (ref 32–36.5)
MCV RBC AUTO: 88.3 FL (ref 78–100)
MONOCYTES # BLD: 0.7 K/MCL (ref 0.3–0.9)
MONOCYTES NFR BLD: 8 %
NEUTROPHILS # BLD: 5.3 K/MCL (ref 1.8–7.7)
NEUTROPHILS NFR BLD: 61 %
NONHDLC SERPL-MCNC: 138 MG/DL
NRBC BLD MANUAL-RTO: 0 /100 WBC
PLATELET # BLD AUTO: 255 K/MCL (ref 140–450)
POTASSIUM SERPL-SCNC: 4.4 MMOL/L (ref 3.4–5.1)
PROT SERPL-MCNC: 7.3 G/DL (ref 6.4–8.2)
PSA SERPL-MCNC: 1.02 NG/ML
RBC # BLD: 4.87 MIL/MCL (ref 4.5–5.9)
SODIUM SERPL-SCNC: 141 MMOL/L (ref 135–145)
TRIGL SERPL-MCNC: 213 MG/DL
TSH SERPL-ACNC: 2.21 MCUNITS/ML (ref 0.35–5)
WBC # BLD: 8.5 K/MCL (ref 4.2–11)

## 2024-07-25 ENCOUNTER — TELEPHONE (OUTPATIENT)
Dept: FAMILY MEDICINE | Age: 49
End: 2024-07-25

## 2024-07-25 DIAGNOSIS — E66.01 CLASS 3 SEVERE OBESITY DUE TO EXCESS CALORIES WITHOUT SERIOUS COMORBIDITY WITH BODY MASS INDEX (BMI) OF 40.0 TO 44.9 IN ADULT  (CMD): ICD-10-CM

## 2024-08-10 LAB — HEMOCCULT STL QL: NEGATIVE

## 2024-08-22 PROBLEM — Z76.89 ENCOUNTER TO ESTABLISH CARE WITH NEW DOCTOR: Status: RESOLVED | Noted: 2024-07-19 | Resolved: 2024-08-22

## 2024-08-23 ENCOUNTER — APPOINTMENT (OUTPATIENT)
Dept: FAMILY MEDICINE | Age: 49
End: 2024-08-23

## 2024-08-23 VITALS
OXYGEN SATURATION: 99 % | BODY MASS INDEX: 40.55 KG/M2 | DIASTOLIC BLOOD PRESSURE: 90 MMHG | HEART RATE: 74 BPM | SYSTOLIC BLOOD PRESSURE: 142 MMHG | TEMPERATURE: 97.8 F | WEIGHT: 280.09 LBS

## 2024-08-23 DIAGNOSIS — E66.01 CLASS 3 SEVERE OBESITY DUE TO EXCESS CALORIES WITHOUT SERIOUS COMORBIDITY WITH BODY MASS INDEX (BMI) OF 40.0 TO 44.9 IN ADULT  (CMD): ICD-10-CM

## 2024-08-23 DIAGNOSIS — E78.1 HYPERTRIGLYCERIDEMIA: Primary | ICD-10-CM

## 2024-08-23 DIAGNOSIS — R74.8 ELEVATED LIVER ENZYMES: ICD-10-CM

## 2024-08-23 DIAGNOSIS — R19.7 DIARRHEA, UNSPECIFIED TYPE: ICD-10-CM

## 2024-08-23 DIAGNOSIS — I49.8 SINUS ARRHYTHMIA SEEN ON ELECTROCARDIOGRAM: ICD-10-CM

## 2024-08-23 DIAGNOSIS — I10 ESSENTIAL HYPERTENSION: ICD-10-CM

## 2024-08-23 RX ORDER — IRBESARTAN 150 MG/1
150 TABLET ORAL DAILY
Qty: 90 TABLET | Refills: 1 | Status: SHIPPED | OUTPATIENT
Start: 2024-08-23 | End: 2025-02-19

## 2024-08-23 ASSESSMENT — PATIENT HEALTH QUESTIONNAIRE - PHQ9
2. FEELING DOWN, DEPRESSED OR HOPELESS: NOT AT ALL
SUM OF ALL RESPONSES TO PHQ9 QUESTIONS 1 AND 2: 0
1. LITTLE INTEREST OR PLEASURE IN DOING THINGS: NOT AT ALL
SUM OF ALL RESPONSES TO PHQ9 QUESTIONS 1 AND 2: 0
CLINICAL INTERPRETATION OF PHQ2 SCORE: NO FURTHER SCREENING NEEDED

## 2024-08-27 ENCOUNTER — HOSPITAL ENCOUNTER (OUTPATIENT)
Dept: CARDIOLOGY | Age: 49
Discharge: HOME | End: 2024-08-27

## 2024-08-27 DIAGNOSIS — I49.8 SINUS ARRHYTHMIA SEEN ON ELECTROCARDIOGRAM: ICD-10-CM

## 2024-08-27 DIAGNOSIS — I10 ESSENTIAL HYPERTENSION: ICD-10-CM

## 2024-09-08 NOTE — PATIENT INSTRUCTIONS
"It was a pleasure seeing you today. I would like to see you back in clinic in  1-2  week after stress testing. You can call my office if questions arise between now and our next visit.     Today, we talked about your atrial fibrillation and your EKG   -- We talked about your EKG changing.   .  Given the patient's risk factors and symptoms, we will obtain a stress test for further ischemic evaluation.  -- we will check labs for risk stratification   -- I have sent a prescription for as needed nitroglycerin.     Below are some Heart Health Tips that we provide to all of our patients. I hope you fing them useful.     - If you are having problems with medications, consider looking at the following websites.   --  \"[employer]\"   --  \"[employer] [facility]\"      - We are happy to supply written prescriptions if needed to allow you to obtain your medications from different pharmacies. Additionally, if you are having issues with mail order delivery, please let us know. We can send a limited supply of your medications to your local pharmacy.     -  We recommend you follow a heart healthy diet. Watch food labels and try not to eat more than 2,500 mg of sodium per day. Avoid foods high in salt like processed meats (lunch meats, parham, and sausage), processed foods (boxed dinners, canned soups), fried and fast foods. Monitor serving sizes and if the sodium per serving size is more than 200 mg, avoid those foods. If the sodium per serving size is between 100-200 mg, you can use those in limited quantities. Try to choose foods where the amount of sodium per serving size is less than 100 mg. Try to eat a diet rich in fruits and vegetables, whole grains, low fat dairy products, skinless poultry and fish, nuts, beans, non-tropical vegetable oils. Limit saturated fat, trans fat, sodium, red meats, and sugar-sweetened beverages.   Limit alcohol     -The combination of a reduced-calorie diet and increased physical activity is " recommended. Adults should aim to get at least 150 minutes of moderate physical activity per week (30 minutes of moderate physical activities at least 5 days per week). Examples of moderate physical activities include brisk walking, swimming, aerobic dancing, heavy gardening, jumping rope, bicycling 10 MPH or faster, tennis, hiking uphill or with a heavy backpack. Please let us know if you would like to learn more about your nutrition and calories and additional options including weight loss programs to help you reach your goal.     -If you smoke, stop smoking. If you stop smoking you can help get rid of a major source of stress to your heart. Smoking makes your heart rate and blood pressure go up and increases your risk or developing cardiovascular diseases and worsen symptoms associated with heart failure.     -Obtain a BP monitor and monitor your BP daily. Check it around the same time each day; at least 1 hour after taking your medications. Record your BP in a log and bring your log with you to your doctors appointment.     -F/u with your PCP as recommended.      40 40 40 40

## 2024-09-19 ENCOUNTER — TELEPHONE (OUTPATIENT)
Dept: FAMILY MEDICINE | Age: 49
End: 2024-09-19

## 2024-09-19 DIAGNOSIS — I49.8 VENTRICULAR TRIGEMINY: Primary | ICD-10-CM

## 2024-09-20 ENCOUNTER — OFFICE VISIT (OUTPATIENT)
Dept: CARDIOLOGY | Age: 49
End: 2024-09-20

## 2024-09-20 ENCOUNTER — OFFICE VISIT (OUTPATIENT)
Dept: FAMILY MEDICINE | Age: 49
End: 2024-09-20

## 2024-09-20 VITALS
HEART RATE: 73 BPM | WEIGHT: 274 LBS | HEIGHT: 70 IN | SYSTOLIC BLOOD PRESSURE: 136 MMHG | DIASTOLIC BLOOD PRESSURE: 82 MMHG | BODY MASS INDEX: 39.22 KG/M2

## 2024-09-20 VITALS
DIASTOLIC BLOOD PRESSURE: 77 MMHG | HEART RATE: 61 BPM | BODY MASS INDEX: 39.8 KG/M2 | OXYGEN SATURATION: 99 % | TEMPERATURE: 98.1 F | SYSTOLIC BLOOD PRESSURE: 132 MMHG | WEIGHT: 274.91 LBS

## 2024-09-20 DIAGNOSIS — E66.09 CLASS 2 OBESITY DUE TO EXCESS CALORIES WITHOUT SERIOUS COMORBIDITY WITH BODY MASS INDEX (BMI) OF 39.0 TO 39.9 IN ADULT: ICD-10-CM

## 2024-09-20 DIAGNOSIS — K21.9 GASTROESOPHAGEAL REFLUX DISEASE, UNSPECIFIED WHETHER ESOPHAGITIS PRESENT: ICD-10-CM

## 2024-09-20 DIAGNOSIS — I10 ESSENTIAL HYPERTENSION: ICD-10-CM

## 2024-09-20 DIAGNOSIS — I49.8 SINUS ARRHYTHMIA SEEN ON ELECTROCARDIOGRAM: Primary | ICD-10-CM

## 2024-09-20 DIAGNOSIS — R00.2 PALPITATIONS: Primary | ICD-10-CM

## 2024-09-20 PROBLEM — E66.812 CLASS 2 OBESITY DUE TO EXCESS CALORIES WITHOUT SERIOUS COMORBIDITY WITH BODY MASS INDEX (BMI) OF 39.0 TO 39.9 IN ADULT: Status: ACTIVE | Noted: 2024-07-19

## 2024-09-20 LAB
ATRIAL RATE (BPM): 73
P AXIS (DEGREES): 60
PR-INTERVAL (MSEC): 188
QRS-INTERVAL (MSEC): 90
QT-INTERVAL (MSEC): 388
QTC: 427
R AXIS (DEGREES): 94
REPORT TEXT: NORMAL
T AXIS (DEGREES): 22
VENTRICULAR RATE EKG/MIN (BPM): 73

## 2024-09-20 ASSESSMENT — PATIENT HEALTH QUESTIONNAIRE - PHQ9
CLINICAL INTERPRETATION OF PHQ2 SCORE: NO FURTHER SCREENING NEEDED
SUM OF ALL RESPONSES TO PHQ9 QUESTIONS 1 AND 2: 0
1. LITTLE INTEREST OR PLEASURE IN DOING THINGS: NOT AT ALL
2. FEELING DOWN, DEPRESSED OR HOPELESS: NOT AT ALL
1. LITTLE INTEREST OR PLEASURE IN DOING THINGS: NOT AT ALL
SUM OF ALL RESPONSES TO PHQ9 QUESTIONS 1 AND 2: 0
CLINICAL INTERPRETATION OF PHQ2 SCORE: NO FURTHER SCREENING NEEDED
SUM OF ALL RESPONSES TO PHQ9 QUESTIONS 1 AND 2: 0
2. FEELING DOWN, DEPRESSED OR HOPELESS: NOT AT ALL
SUM OF ALL RESPONSES TO PHQ9 QUESTIONS 1 AND 2: 0

## 2024-09-20 ASSESSMENT — ENCOUNTER SYMPTOMS
FEVER: 0
SHORTNESS OF BREATH: 0
COLOR CHANGE: 0
COUGH: 0
FOCAL WEAKNESS: 0
CHILLS: 0

## 2024-09-24 ENCOUNTER — APPOINTMENT (OUTPATIENT)
Dept: LAB | Age: 49
End: 2024-09-24

## 2024-09-24 DIAGNOSIS — R00.2 PALPITATIONS: ICD-10-CM

## 2024-09-24 LAB — MAGNESIUM SERPL-MCNC: 2.1 MG/DL (ref 1.7–2.4)

## 2024-09-24 PROCEDURE — 36415 COLL VENOUS BLD VENIPUNCTURE: CPT | Performed by: HOSPITALIST

## 2024-09-24 PROCEDURE — 83735 ASSAY OF MAGNESIUM: CPT | Performed by: CLINICAL MEDICAL LABORATORY

## 2024-09-25 ENCOUNTER — E-ADVICE (OUTPATIENT)
Dept: FAMILY MEDICINE | Age: 49
End: 2024-09-25

## 2024-09-25 DIAGNOSIS — E66.09 CLASS 2 OBESITY DUE TO EXCESS CALORIES WITHOUT SERIOUS COMORBIDITY WITH BODY MASS INDEX (BMI) OF 39.0 TO 39.9 IN ADULT: Primary | ICD-10-CM

## 2024-09-27 ENCOUNTER — APPOINTMENT (OUTPATIENT)
Dept: FAMILY MEDICINE | Age: 49
End: 2024-09-27

## 2024-10-18 ENCOUNTER — APPOINTMENT (OUTPATIENT)
Dept: FAMILY MEDICINE | Age: 49
End: 2024-10-18

## 2024-10-18 VITALS
SYSTOLIC BLOOD PRESSURE: 121 MMHG | WEIGHT: 265.65 LBS | DIASTOLIC BLOOD PRESSURE: 74 MMHG | TEMPERATURE: 97.9 F | OXYGEN SATURATION: 97 % | BODY MASS INDEX: 38.12 KG/M2 | HEART RATE: 62 BPM

## 2024-10-18 DIAGNOSIS — E66.812 CLASS 2 OBESITY DUE TO EXCESS CALORIES WITHOUT SERIOUS COMORBIDITY WITH BODY MASS INDEX (BMI) OF 38.0 TO 38.9 IN ADULT: ICD-10-CM

## 2024-10-18 DIAGNOSIS — K21.9 GASTROESOPHAGEAL REFLUX DISEASE WITHOUT ESOPHAGITIS: ICD-10-CM

## 2024-10-18 DIAGNOSIS — I10 ESSENTIAL HYPERTENSION: Primary | ICD-10-CM

## 2024-10-18 DIAGNOSIS — E66.09 CLASS 2 OBESITY DUE TO EXCESS CALORIES WITHOUT SERIOUS COMORBIDITY WITH BODY MASS INDEX (BMI) OF 38.0 TO 38.9 IN ADULT: ICD-10-CM

## 2024-10-18 ASSESSMENT — PATIENT HEALTH QUESTIONNAIRE - PHQ9
SUM OF ALL RESPONSES TO PHQ9 QUESTIONS 1 AND 2: 0
SUM OF ALL RESPONSES TO PHQ9 QUESTIONS 1 AND 2: 0
CLINICAL INTERPRETATION OF PHQ2 SCORE: NO FURTHER SCREENING NEEDED
1. LITTLE INTEREST OR PLEASURE IN DOING THINGS: NOT AT ALL
2. FEELING DOWN, DEPRESSED OR HOPELESS: NOT AT ALL

## 2024-10-19 DIAGNOSIS — E66.09 CLASS 2 OBESITY DUE TO EXCESS CALORIES WITHOUT SERIOUS COMORBIDITY WITH BODY MASS INDEX (BMI) OF 38.0 TO 38.9 IN ADULT: ICD-10-CM

## 2024-10-19 DIAGNOSIS — E66.812 CLASS 2 OBESITY DUE TO EXCESS CALORIES WITHOUT SERIOUS COMORBIDITY WITH BODY MASS INDEX (BMI) OF 38.0 TO 38.9 IN ADULT: ICD-10-CM

## 2024-10-22 RX ORDER — SEMAGLUTIDE 1 MG/.5ML
INJECTION, SOLUTION SUBCUTANEOUS
Qty: 2 ML | Refills: 0 | Status: SHIPPED | OUTPATIENT
Start: 2024-10-22

## 2024-10-22 RX ORDER — SEMAGLUTIDE 1 MG/.5ML
INJECTION, SOLUTION SUBCUTANEOUS
Qty: 2 ML | OUTPATIENT
Start: 2024-10-22

## 2024-10-24 ENCOUNTER — APPOINTMENT (OUTPATIENT)
Dept: CARDIOLOGY | Age: 49
End: 2024-10-24
Attending: HOSPITALIST

## 2024-10-24 DIAGNOSIS — R00.2 PALPITATIONS: ICD-10-CM

## 2024-10-24 LAB
AORTIC VALVE AREA (AVA): 0.74
AV STENOSIS SEVERITY TEXT: NORMAL
AVI LVOT PEAK GRADIENT (LVOTMG): 0.8
E WAVE DECELARATION TIME (MDT): 11.04
INTERVENTRICULAR SEPTUM IN END DIASTOLE (IVSD): 1.78
LEFT INTERNAL DIMENSION IN SYSTOLE (LVSD): 0.9
LEFT VENTRICULAR INTERNAL DIMENSION IN DIASTOLE (LVDD): 2.9
LEFT VENTRICULAR POSTERIOR WALL IN END DIASTOLE (LVPW): 4.6
LV EF: NORMAL %
MV E TISSUE VEL MED (MESV): 14.7
MV E WAVE VEL/E TISSUE VEL MED(MSR): 6.74
MV PEAK A VELOCITY (MVPAV): 195
MV PEAK E VELOCITY (MVPEV): 0.64
TV ESTIMATED RIGHT ARTERIAL PRESSURE (RAP): 11

## 2024-10-24 PROCEDURE — 93306 TTE W/DOPPLER COMPLETE: CPT | Performed by: HOSPITALIST

## 2024-11-03 ENCOUNTER — E-ADVICE (OUTPATIENT)
Dept: FAMILY MEDICINE | Age: 49
End: 2024-11-03

## 2024-11-03 SDOH — ECONOMIC STABILITY: FOOD INSECURITY: WITHIN THE PAST 12 MONTHS, THE FOOD YOU BOUGHT JUST DIDN'T LAST AND YOU DIDN'T HAVE MONEY TO GET MORE.: NEVER TRUE

## 2024-11-03 SDOH — ECONOMIC STABILITY: HOUSING INSECURITY: WHAT IS YOUR LIVING SITUATION TODAY?: I HAVE A STEADY PLACE TO LIVE

## 2024-11-03 SDOH — ECONOMIC STABILITY: GENERAL: WOULD YOU LIKE HELP WITH ANY OF THE FOLLOWING NEEDS?: I DON'T WANT HELP WITH ANY OF THESE

## 2024-11-03 SDOH — ECONOMIC STABILITY: HOUSING INSECURITY: DO YOU HAVE PROBLEMS WITH ANY OF THE FOLLOWING?: NONE OF THE ABOVE

## 2024-11-03 SDOH — ECONOMIC STABILITY: TRANSPORTATION INSECURITY
IN THE PAST 12 MONTHS, HAS LACK OF RELIABLE TRANSPORTATION KEPT YOU FROM MEDICAL APPOINTMENTS, MEETINGS, WORK OR FROM GETTING THINGS NEEDED FOR DAILY LIVING?: NO

## 2024-11-03 ASSESSMENT — SOCIAL DETERMINANTS OF HEALTH (SDOH): IN THE PAST 12 MONTHS, HAS THE ELECTRIC, GAS, OIL, OR WATER COMPANY THREATENED TO SHUT OFF SERVICE IN YOUR HOME?: NO

## 2024-11-04 ENCOUNTER — APPOINTMENT (OUTPATIENT)
Age: 49
End: 2024-11-04

## 2024-11-04 ENCOUNTER — LAB (OUTPATIENT)
Dept: LAB | Facility: LAB | Age: 49
End: 2024-11-04
Payer: COMMERCIAL

## 2024-11-04 ENCOUNTER — OFFICE VISIT (OUTPATIENT)
Dept: CARDIOLOGY | Facility: CLINIC | Age: 49
End: 2024-11-04
Payer: COMMERCIAL

## 2024-11-04 ENCOUNTER — ANCILLARY PROCEDURE (OUTPATIENT)
Dept: CARDIOLOGY | Facility: CLINIC | Age: 49
End: 2024-11-04
Payer: COMMERCIAL

## 2024-11-04 VITALS
DIASTOLIC BLOOD PRESSURE: 88 MMHG | OXYGEN SATURATION: 95 % | HEART RATE: 67 BPM | HEIGHT: 72 IN | WEIGHT: 225 LBS | BODY MASS INDEX: 30.48 KG/M2 | SYSTOLIC BLOOD PRESSURE: 146 MMHG

## 2024-11-04 VITALS
HEART RATE: 78 BPM | WEIGHT: 263.12 LBS | OXYGEN SATURATION: 95 % | DIASTOLIC BLOOD PRESSURE: 88 MMHG | HEIGHT: 70 IN | BODY MASS INDEX: 37.67 KG/M2 | SYSTOLIC BLOOD PRESSURE: 127 MMHG

## 2024-11-04 DIAGNOSIS — R29.818 SUSPECTED SLEEP APNEA: ICD-10-CM

## 2024-11-04 DIAGNOSIS — R07.89 ATYPICAL CHEST PAIN: ICD-10-CM

## 2024-11-04 DIAGNOSIS — E78.01 FAMILIAL HYPERCHOLESTEROLEMIA: ICD-10-CM

## 2024-11-04 DIAGNOSIS — E78.00 HIGH CHOLESTEROL: ICD-10-CM

## 2024-11-04 DIAGNOSIS — E66.9 OBESITY (BMI 35.0-39.9 WITHOUT COMORBIDITY): ICD-10-CM

## 2024-11-04 DIAGNOSIS — I48.0 PAROXYSMAL ATRIAL FIBRILLATION (MULTI): ICD-10-CM

## 2024-11-04 DIAGNOSIS — K21.9 GASTROESOPHAGEAL REFLUX DISEASE WITHOUT ESOPHAGITIS: Primary | ICD-10-CM

## 2024-11-04 DIAGNOSIS — I10 ESSENTIAL (PRIMARY) HYPERTENSION: ICD-10-CM

## 2024-11-04 DIAGNOSIS — R74.01 ELEVATED LIVER TRANSAMINASE LEVEL: ICD-10-CM

## 2024-11-04 DIAGNOSIS — I48.0 PAROXYSMAL ATRIAL FIBRILLATION (MULTI): Primary | ICD-10-CM

## 2024-11-04 LAB
ALBUMIN SERPL BCP-MCNC: 4.1 G/DL (ref 3.4–5)
ALP SERPL-CCNC: 58 U/L (ref 33–120)
ALT SERPL W P-5'-P-CCNC: 13 U/L (ref 10–52)
ANION GAP SERPL CALCULATED.3IONS-SCNC: 14 MMOL/L (ref 10–20)
AST SERPL W P-5'-P-CCNC: 14 U/L (ref 9–39)
BASOPHILS # BLD AUTO: 0.04 X10*3/UL (ref 0–0.1)
BASOPHILS NFR BLD AUTO: 0.6 %
BILIRUB SERPL-MCNC: 1 MG/DL (ref 0–1.2)
BUN SERPL-MCNC: 23 MG/DL (ref 6–23)
CALCIUM SERPL-MCNC: 9.5 MG/DL (ref 8.6–10.3)
CHLORIDE SERPL-SCNC: 102 MMOL/L (ref 98–107)
CHOLEST SERPL-MCNC: 213 MG/DL (ref 0–199)
CHOLEST/HDLC SERPL: 4 {RATIO}
CO2 SERPL-SCNC: 27 MMOL/L (ref 21–32)
CREAT SERPL-MCNC: 1.02 MG/DL (ref 0.5–1.3)
EGFRCR SERPLBLD CKD-EPI 2021: 90 ML/MIN/1.73M*2
EOSINOPHIL # BLD AUTO: 0.14 X10*3/UL (ref 0–0.7)
EOSINOPHIL NFR BLD AUTO: 2 %
ERYTHROCYTE [DISTWIDTH] IN BLOOD BY AUTOMATED COUNT: 11.8 % (ref 11.5–14.5)
GLUCOSE SERPL-MCNC: 90 MG/DL (ref 74–99)
HCT VFR BLD AUTO: 46.5 % (ref 41–52)
HDLC SERPL-MCNC: 52.8 MG/DL
HGB BLD-MCNC: 16.3 G/DL (ref 13.5–17.5)
IMM GRANULOCYTES # BLD AUTO: 0.03 X10*3/UL (ref 0–0.7)
IMM GRANULOCYTES NFR BLD AUTO: 0.4 % (ref 0–0.9)
LDLC SERPL CALC-MCNC: 124 MG/DL
LYMPHOCYTES # BLD AUTO: 1.91 X10*3/UL (ref 1.2–4.8)
LYMPHOCYTES NFR BLD AUTO: 27.4 %
MCH RBC QN AUTO: 32.3 PG (ref 26–34)
MCHC RBC AUTO-ENTMCNC: 35.1 G/DL (ref 32–36)
MCV RBC AUTO: 92 FL (ref 80–100)
MONOCYTES # BLD AUTO: 0.65 X10*3/UL (ref 0.1–1)
MONOCYTES NFR BLD AUTO: 9.3 %
NEUTROPHILS # BLD AUTO: 4.2 X10*3/UL (ref 1.2–7.7)
NEUTROPHILS NFR BLD AUTO: 60.3 %
NON HDL CHOLESTEROL: 160 MG/DL (ref 0–149)
NRBC BLD-RTO: 0 /100 WBCS (ref 0–0)
PLATELET # BLD AUTO: 264 X10*3/UL (ref 150–450)
POTASSIUM SERPL-SCNC: 4 MMOL/L (ref 3.5–5.3)
PROT SERPL-MCNC: 7.3 G/DL (ref 6.4–8.2)
RBC # BLD AUTO: 5.05 X10*6/UL (ref 4.5–5.9)
SODIUM SERPL-SCNC: 139 MMOL/L (ref 136–145)
TRIGL SERPL-MCNC: 180 MG/DL (ref 0–149)
TSH SERPL-ACNC: 1.61 MIU/L (ref 0.44–3.98)
VLDL: 36 MG/DL (ref 0–40)
WBC # BLD AUTO: 7 X10*3/UL (ref 4.4–11.3)

## 2024-11-04 PROCEDURE — 36415 COLL VENOUS BLD VENIPUNCTURE: CPT

## 2024-11-04 PROCEDURE — 84443 ASSAY THYROID STIM HORMONE: CPT

## 2024-11-04 PROCEDURE — 85025 COMPLETE CBC W/AUTO DIFF WBC: CPT

## 2024-11-04 PROCEDURE — 99214 OFFICE O/P EST MOD 30 MIN: CPT | Performed by: INTERNAL MEDICINE

## 2024-11-04 PROCEDURE — 3079F DIAST BP 80-89 MM HG: CPT | Performed by: INTERNAL MEDICINE

## 2024-11-04 PROCEDURE — 93005 ELECTROCARDIOGRAM TRACING: CPT | Performed by: INTERNAL MEDICINE

## 2024-11-04 PROCEDURE — 80061 LIPID PANEL: CPT

## 2024-11-04 PROCEDURE — 3077F SYST BP >= 140 MM HG: CPT | Performed by: INTERNAL MEDICINE

## 2024-11-04 PROCEDURE — 99204 OFFICE O/P NEW MOD 45 MIN: CPT | Performed by: INTERNAL MEDICINE

## 2024-11-04 PROCEDURE — G2211 COMPLEX E/M VISIT ADD ON: HCPCS | Performed by: INTERNAL MEDICINE

## 2024-11-04 PROCEDURE — 3074F SYST BP LT 130 MM HG: CPT | Performed by: INTERNAL MEDICINE

## 2024-11-04 PROCEDURE — 93005 ELECTROCARDIOGRAM TRACING: CPT

## 2024-11-04 PROCEDURE — 3008F BODY MASS INDEX DOCD: CPT | Performed by: INTERNAL MEDICINE

## 2024-11-04 PROCEDURE — 80053 COMPREHEN METABOLIC PANEL: CPT

## 2024-11-04 PROCEDURE — 82172 ASSAY OF APOLIPOPROTEIN: CPT

## 2024-11-04 RX ORDER — HYDROCHLOROTHIAZIDE 25 MG/1
25 TABLET ORAL DAILY
Qty: 90 TABLET | Refills: 3 | Status: SHIPPED | OUTPATIENT
Start: 2024-11-04 | End: 2025-11-04

## 2024-11-04 RX ORDER — METOPROLOL SUCCINATE 25 MG/1
25 TABLET, EXTENDED RELEASE ORAL DAILY
Qty: 90 TABLET | Refills: 3 | Status: SHIPPED | OUTPATIENT
Start: 2024-11-04 | End: 2025-11-04

## 2024-11-04 RX ORDER — VERAPAMIL HYDROCHLORIDE 360 MG/1
360 CAPSULE, DELAYED RELEASE PELLETS ORAL DAILY
Qty: 90 CAPSULE | Refills: 3 | Status: SHIPPED | OUTPATIENT
Start: 2024-11-04 | End: 2025-11-04

## 2024-11-04 ASSESSMENT — PAIN SCALES - GENERAL: PAINLEVEL_OUTOF10: 0-NO PAIN

## 2024-11-04 NOTE — ASSESSMENT & PLAN NOTE
In Office  -- Initially 174/ 101 that reduces to 146/98 at the end of the appointment.  This still been difficult historically to control  -- We will obtain a transthoracic echocardiogram for structural evaluation including ejection fraction, assessment of regional wall motion abnormalities or valvular disease, and further evaluation of hemodynamics.  -- May benefir from ARB, especially if echocardiogram shows LVH  -- May benefit from more effective diuretic if RVSP or IVC elevated / Dilated

## 2024-11-04 NOTE — PROGRESS NOTES
Chief Complaint:   Hypertension, Atrial Fibrillation, and Follow-up (1 year)     History Of Present Illness:    Jacinto Neri is a 49 y.o. male presenting with  a pertient medical Hx notable for adjustment insomnia, dyslipidemia, hypertension, paroxysmal atrial fibrillation who presents to cardiology for follow up care.     Recall that he had been in his usual state of health. He was getting ready for work when he began to experience intermittent heart palpitations associated with this was a fogginess to his brain, he went to work, where his supervisor felt that he is unwell. EMS was summoned was noted to have hypertensive urgency  For his atrial fibrillation. He was subsequently referred to [facility] were seen in the ER. He received IV labetalol for both blood pressure and rate control. He subsequently converted to normal sinus rhythm and maintained rhythm. He was started on apixaban 5 mg twice daily. He was continued on verapamil 360 mg daily, hydrochlorothiazide 25 mg, spironolactone 25 mg. He was discharged with event monitor..      Since his discharge, he has been doing well/notes of blood pressure has been better controlled. He notes a heart palpitations has also been better controlled. He has reduced and limiting nicotine and caffeine use which she feels has been helpful for him. He voices no additional complaints     Today ( 11/18/2022) he returns for follow up. He has just returned from a family vacation in [facility]. He was noticing that his legs have had a little more irritation and burn to them. Because of symptoms, he stopped amlodipine he feels that maybe his symptoms are related to amlodipine as he thinks the pill became bigger and changed. After this he began to have some of his discomfort.      5/18/2023 -- He returns for follow up. He has not had significant heart issues since the last seen.    11/13 /2023  - He returns for follow up. He has been havig some chest discomfor that can  occur at rest or exertion.  He has been intermittently compliant with his blood pressure medications.     11/4/2024 -- He returns for follow up. It has been a year since he was last seen.  Notes that he has been keeping busy. He has not had any protracted heart palpitations in the recent brandon e and when he gets them, only fleeting at times. He notes that his blood pressures have been higher than normal He has also had some difficulty sleeping,        Last Recorded Vitals:  Vitals:    11/04/24 0947   BP: 146/88   BP Location: Right arm   Patient Position: Sitting   Pulse: 67   SpO2: 95%   Weight: 102 kg (225 lb)   Height: 1.829 m (6')         Past Medical History:  He has a past medical history of Blepharitis of left upper eyelid, Blepharitis of right upper eyelid, and Refractive error.    Past Surgical History:  He has a past surgical history that includes Cardiac catheterization (06/25/2013) and Other surgical history (06/25/2013).      Social History:  He reports that he has quit smoking. His smoking use included cigarettes. He has never used smokeless tobacco. He reports that he does not currently use alcohol. He reports that he does not use drugs.    Family History:  No family history on file.     Allergies:  Lisinopril    Outpatient Medications:  Current Outpatient Medications   Medication Instructions    hydroCHLOROthiazide (HYDRODIURIL) 25 mg, oral, Daily, Needs appointment by 11/2024    metoprolol succinate XL (TOPROL-XL) 25 mg, oral, Daily, Needs appointment by 11/2024    nitroglycerin (NITROSTAT) 0.4 mg, sublingual, Every 5 min PRN, May repeat dose every 5 minutes for up to 3 doses total.    rivaroxaban (XARELTO) 20 mg, oral, Daily, Needs appointment by 11/2024    verapamil ER (VERALAN PM) 360 mg, oral, Daily, Needs appointment by 11/2024       Physical Exam:  /88 (BP Location: Right arm, Patient Position: Sitting)   Pulse 67   Ht 1.829 m (6')   Wt 102 kg (225 lb)   SpO2 95%   BMI 30.52 kg/m²    /88 (BP Location: Right arm, Patient Position: Sitting)   Pulse 67   Ht 1.829 m (6')   Wt 102 kg (225 lb)   SpO2 95%   BMI 30.52 kg/m²   General:  Patient is awake, alert, and oriented.  Patient is in no acute distress.  HEENT:  Pupils equal and reactive.  Normocephalic.  Moist mucosa.    Neck:  No thyromegaly.  Normal Jugular Venous Pressure.  Cardiovascular:  Regular rate and rhythm.  Normal S1 and S2.  1/6 TODD.  Pulmonary:  Clear to auscultation bilaterally.  Abdomen:  Soft. Non-tender.   Non-distended.  Positive bowel sounds.  Lower Extremities:  2+ pedal pulses. No LE edema.  Neurologic:  Cranial nerves intact.  No focal deficit.   Skin: Skin warm and dry, normal skin turgor.   Psychiatric: Normal affect.            BC -  Lab Results   Component Value Date    WBC 6.9 04/13/2022    HGB 16.7 04/13/2022    HCT 49.6 04/13/2022    MCV 91 04/13/2022     04/13/2022       CMP -  Lab Results   Component Value Date    CALCIUM 9.8 04/13/2022    PROT 7.9 04/13/2022    ALBUMIN 4.4 04/13/2022    ALBUMIN 4.3 02/08/2019    AST 25 04/13/2022    ALT 20 04/13/2022    ALKPHOS 60 04/13/2022    BILITOT 0.8 04/13/2022       LIPID PANEL -   Lab Results   Component Value Date    CHOL 190 02/08/2019    TRIG 80 02/08/2019    HDL 47 02/08/2019    CHHDL 4.0 02/08/2019       RENAL FUNCTION PANEL -   Lab Results   Component Value Date    GLUCOSE 105 (H) 04/13/2022     04/13/2022    K 3.8 04/13/2022     04/13/2022    CO2 26 04/13/2022    ANIONGAP 14 04/13/2022    BUN 16 04/13/2022    CREATININE 1.02 04/13/2022    GFRMALE >90 04/13/2022    CALCIUM 9.8 04/13/2022    ALBUMIN 4.4 04/13/2022    ALBUMIN 4.3 02/08/2019        Lab Results   Component Value Date    BNP 13 04/13/2022    HGBA1C 5.1 02/08/2019       Last Cardiology Tests:  ECG:  In office EKG  -- Sinus rhythm 71 bpm  -- Q waves present V1 through V3     Event monitor 04/2022  Primo1D 14 day holter monitor    rhythm sinus bradycardia to sinus  "tachycardia with occurrence of first-degree AV block.  Minimum heart rate 57, maximum heart rate 132, average heart rate 77  954 PVCs burden 0.07%, 70 2P SVC's burden of less than 0.01%, 16 patient reported events correlating with sinus rhythm no additional atrial fibrillation seen.         Lab review: I have personally reviewed the laboratory result(s)   Diagnostic review: I have personally reviewed the result(s) of the EKG and Echocardiogram .   Reports very poor sleep.  Assessment/Plan   Problem List Items Addressed This Visit       Atypical chest pain    Overview     Atypical Chest pain with New Q waves in the Septal Leads  Re-stratifiy for ischemic Disease   Nuclear Perfusion Stress Test         Essential (primary) hypertension    Overview     He has documented angioedema to ACE inhibitors  He had myalgia related to use of Amlodipine-- Stopped             Current Assessment & Plan     In Office  -- Initially 174/ 101 that reduces to 146/98 at the end of the appointment.  This still been difficult historically to control  -- We will obtain a transthoracic echocardiogram for structural evaluation including ejection fraction, assessment of regional wall motion abnormalities or valvular disease, and further evaluation of hemodynamics.  -- May benefir from ARB, especially if echocardiogram shows LVH  -- May benefit from more effective diuretic if RVSP or IVC elevated / Dilated          High cholesterol    Overview     The ASCVD Risk score (Raheem HUI, et al., 2019) failed to calculate for the following reasons:    Cannot find a previous HDL lab    Cannot find a previous total cholesterol lab  Lab Results   Component Value Date    CHOL 190 02/08/2019     Lab Results   Component Value Date    HDL 47 02/08/2019     Lab Results   Component Value Date    LDLCALC 127 02/08/2019     Lab Results   Component Value Date    TRIG 80 02/08/2019     No components found for: \"CHOLHDL\"           Hyperlipemia    Overview     The " "ASCVD Risk score (Raheem HUI, et al., 2019) failed to calculate for the following reasons:    Risk score cannot be calculated because patient has a medical history suggesting prior/existing ASCVD  Lab Results   Component Value Date    CHOL 190 02/08/2019     Lab Results   Component Value Date    HDL 47 02/08/2019     Lab Results   Component Value Date    LDLCALC 127 02/08/2019     Lab Results   Component Value Date    TRIG 80 02/08/2019     No components found for: \"CHOLHDL\"           Paroxysmal atrial fibrillation (Multi) - Primary    Overview     Initial Event 04/2022   [id number] score =1 ( HTN)   --Continue Apixaban at this time   -- Continue Verapamil / Metoprolol for Rate / Rhythm control             Charanjit Palmer DO   Division of Cardiovascular Medicine  [facility] [place]        "

## 2024-11-06 LAB — LPA SERPL-MCNC: <6 MG/DL

## 2024-11-08 ENCOUNTER — APPOINTMENT (OUTPATIENT)
Dept: FAMILY MEDICINE | Age: 49
End: 2024-11-08

## 2024-11-08 VITALS
TEMPERATURE: 97.4 F | BODY MASS INDEX: 37.6 KG/M2 | OXYGEN SATURATION: 97 % | SYSTOLIC BLOOD PRESSURE: 123 MMHG | WEIGHT: 262.02 LBS | HEART RATE: 82 BPM | DIASTOLIC BLOOD PRESSURE: 73 MMHG

## 2024-11-08 DIAGNOSIS — Z28.21 IMMUNIZATION DECLINED: ICD-10-CM

## 2024-11-08 DIAGNOSIS — M10.9 GOUT OF BIG TOE: ICD-10-CM

## 2024-11-08 DIAGNOSIS — K21.9 GASTROESOPHAGEAL REFLUX DISEASE WITHOUT ESOPHAGITIS: ICD-10-CM

## 2024-11-08 DIAGNOSIS — R74.8 ELEVATED LIVER ENZYMES: ICD-10-CM

## 2024-11-08 DIAGNOSIS — E66.09 CLASS 2 OBESITY DUE TO EXCESS CALORIES WITHOUT SERIOUS COMORBIDITY WITH BODY MASS INDEX (BMI) OF 37.0 TO 37.9 IN ADULT: Primary | ICD-10-CM

## 2024-11-08 DIAGNOSIS — E66.812 CLASS 2 OBESITY DUE TO EXCESS CALORIES WITHOUT SERIOUS COMORBIDITY WITH BODY MASS INDEX (BMI) OF 37.0 TO 37.9 IN ADULT: Primary | ICD-10-CM

## 2024-11-08 ASSESSMENT — PATIENT HEALTH QUESTIONNAIRE - PHQ9
1. LITTLE INTEREST OR PLEASURE IN DOING THINGS: NOT AT ALL
SUM OF ALL RESPONSES TO PHQ9 QUESTIONS 1 AND 2: 0
SUM OF ALL RESPONSES TO PHQ9 QUESTIONS 1 AND 2: 0
CLINICAL INTERPRETATION OF PHQ2 SCORE: NO FURTHER SCREENING NEEDED
2. FEELING DOWN, DEPRESSED OR HOPELESS: NOT AT ALL

## 2024-11-09 LAB
ALBUMIN SERPL-MCNC: 4.1 G/DL (ref 3.4–5)
ALBUMIN/GLOB SERPL: 1.2 {RATIO} (ref 1–2.4)
ALP SERPL-CCNC: 79 UNITS/L (ref 45–117)
ALT SERPL-CCNC: 54 UNITS/L
ANION GAP SERPL CALC-SCNC: 10 MMOL/L (ref 7–19)
ANNOTATION COMMENT IMP: NORMAL
AST SERPL-CCNC: 34 UNITS/L
BILIRUB SERPL-MCNC: 0.7 MG/DL (ref 0.2–1)
BUN SERPL-MCNC: 20 MG/DL (ref 6–20)
BUN/CREAT SERPL: 20 (ref 7–25)
CALCIUM SERPL-MCNC: 9.5 MG/DL (ref 8.4–10.2)
CHLORIDE SERPL-SCNC: 108 MMOL/L (ref 97–110)
CO2 SERPL-SCNC: 23 MMOL/L (ref 21–32)
CREAT SERPL-MCNC: 1 MG/DL (ref 0.67–1.17)
EGFRCR SERPLBLD CKD-EPI 2021: >90 ML/MIN/{1.73_M2}
FASTING DURATION TIME PATIENT: NORMAL H
GLOBULIN SER-MCNC: 3.4 G/DL (ref 2–4)
GLUCOSE SERPL-MCNC: 95 MG/DL (ref 70–99)
HBV CORE IGG+IGM SER QL: NEGATIVE
HBV SURFACE AB SER QL: NEGATIVE
HBV SURFACE AG SER QL: NEGATIVE
POTASSIUM SERPL-SCNC: 4.3 MMOL/L (ref 3.4–5.1)
PROT SERPL-MCNC: 7.5 G/DL (ref 6.4–8.2)
SODIUM SERPL-SCNC: 137 MMOL/L (ref 135–145)

## 2024-11-13 LAB
ATRIAL RATE: 67 BPM
P AXIS: 25 DEGREES
P OFFSET: 187 MS
P ONSET: 131 MS
PR INTERVAL: 182 MS
Q ONSET: 222 MS
QRS COUNT: 11 BEATS
QRS DURATION: 88 MS
QT INTERVAL: 384 MS
QTC CALCULATION(BAZETT): 405 MS
QTC FREDERICIA: 398 MS
R AXIS: -21 DEGREES
T AXIS: 83 DEGREES
T OFFSET: 414 MS
VENTRICULAR RATE: 67 BPM

## 2024-11-15 ENCOUNTER — ANCILLARY PROCEDURE (OUTPATIENT)
Dept: CARDIOLOGY | Facility: CLINIC | Age: 49
End: 2024-11-15
Payer: COMMERCIAL

## 2024-11-15 DIAGNOSIS — I48.0 PAROXYSMAL ATRIAL FIBRILLATION (MULTI): ICD-10-CM

## 2024-11-15 DIAGNOSIS — I10 ESSENTIAL (PRIMARY) HYPERTENSION: ICD-10-CM

## 2024-11-15 DIAGNOSIS — R29.818 SUSPECTED SLEEP APNEA: ICD-10-CM

## 2024-11-15 DIAGNOSIS — R07.89 ATYPICAL CHEST PAIN: ICD-10-CM

## 2024-11-15 PROCEDURE — 93306 TTE W/DOPPLER COMPLETE: CPT

## 2024-11-15 PROCEDURE — 93306 TTE W/DOPPLER COMPLETE: CPT | Performed by: INTERNAL MEDICINE

## 2024-11-18 LAB
AORTIC VALVE MEAN GRADIENT: 3 MMHG
AORTIC VALVE PEAK VELOCITY: 1.32 M/S
AV PEAK GRADIENT: 7 MMHG
AVA (PEAK VEL): 3.08 CM2
AVA (VTI): 3.43 CM2
EJECTION FRACTION APICAL 4 CHAMBER: 54
EJECTION FRACTION: 57 %
LEFT ATRIUM VOLUME AREA LENGTH INDEX BSA: 28.8 ML/M2
LEFT VENTRICLE INTERNAL DIMENSION DIASTOLE: 4.62 CM (ref 3.5–6)
LEFT VENTRICULAR OUTFLOW TRACT DIAMETER: 2.4 CM
MITRAL VALVE E/A RATIO: 1.24
RIGHT VENTRICLE FREE WALL PEAK S': 8 CM/S
RIGHT VENTRICLE PEAK SYSTOLIC PRESSURE: 25 MMHG
TRICUSPID ANNULAR PLANE SYSTOLIC EXCURSION: 2.3 CM

## 2024-11-22 ENCOUNTER — APPOINTMENT (OUTPATIENT)
Dept: CARDIOLOGY | Facility: CLINIC | Age: 49
End: 2024-11-22
Payer: COMMERCIAL

## 2024-11-22 VITALS
HEART RATE: 66 BPM | WEIGHT: 231 LBS | BODY MASS INDEX: 31.29 KG/M2 | HEIGHT: 72 IN | OXYGEN SATURATION: 96 % | DIASTOLIC BLOOD PRESSURE: 95 MMHG | SYSTOLIC BLOOD PRESSURE: 158 MMHG | RESPIRATION RATE: 18 BRPM

## 2024-11-22 DIAGNOSIS — I48.0 PAROXYSMAL ATRIAL FIBRILLATION (MULTI): ICD-10-CM

## 2024-11-22 DIAGNOSIS — E78.01 FAMILIAL HYPERCHOLESTEROLEMIA: ICD-10-CM

## 2024-11-22 DIAGNOSIS — T46.4X5S ANGIOTENSIN CONVERTING ENZYME INHIBITOR-AGGRAVATED ANGIOEDEMA, SEQUELA: ICD-10-CM

## 2024-11-22 DIAGNOSIS — E78.00 HIGH CHOLESTEROL: ICD-10-CM

## 2024-11-22 DIAGNOSIS — I10 ESSENTIAL (PRIMARY) HYPERTENSION: Primary | ICD-10-CM

## 2024-11-22 DIAGNOSIS — T78.3XXS ANGIOTENSIN CONVERTING ENZYME INHIBITOR-AGGRAVATED ANGIOEDEMA, SEQUELA: ICD-10-CM

## 2024-11-22 PROCEDURE — 93010 ELECTROCARDIOGRAM REPORT: CPT | Performed by: INTERNAL MEDICINE

## 2024-11-22 PROCEDURE — 3080F DIAST BP >= 90 MM HG: CPT | Performed by: INTERNAL MEDICINE

## 2024-11-22 PROCEDURE — 99214 OFFICE O/P EST MOD 30 MIN: CPT | Performed by: INTERNAL MEDICINE

## 2024-11-22 PROCEDURE — 3077F SYST BP >= 140 MM HG: CPT | Performed by: INTERNAL MEDICINE

## 2024-11-22 PROCEDURE — 99214 OFFICE O/P EST MOD 30 MIN: CPT | Mod: 25 | Performed by: INTERNAL MEDICINE

## 2024-11-22 PROCEDURE — 3008F BODY MASS INDEX DOCD: CPT | Performed by: INTERNAL MEDICINE

## 2024-11-22 RX ORDER — OLMESARTAN MEDOXOMIL 5 MG/1
10 TABLET ORAL DAILY
Qty: 60 TABLET | Refills: 11 | Status: CANCELLED | OUTPATIENT
Start: 2024-11-22 | End: 2025-11-22

## 2024-11-22 ASSESSMENT — ENCOUNTER SYMPTOMS: DEPRESSION: 0

## 2024-11-22 NOTE — PATIENT INSTRUCTIONS
"It was a pleasure seeing you today. I would like to see you back in clinic in April. You can call my office if questions arise between now and our next visit.     Today, we talked about your atrial fibrillation and your EKG   --So Far, you have remained without evidence of Atrial Fibrillation     -- Your Blood pressure remains elevated as does your cholesterol levels     -- I would like to try you on an medication called Olmesartan. It is a cousin to Lisinopril but is not as associated with Angioedema ( swelling of the face ) as lisinopril. There biggest risk of having a repeated occurrence of Angioedema was using medications within the first several months of the lisinopril.     We will give you until April to really work on your diet.   -- Otherwise, we need to start cholesterol medications.     Below are some Heart Health Tips that we provide to all of our patients. I hope you fing them useful.     - If you are having problems with medications, consider looking at the following websites.   --  \"[employer]\"   --  \"[facility]\"      - We are happy to supply written prescriptions if needed to allow you to obtain your medications from different pharmacies. Additionally, if you are having issues with mail order delivery, please let us know. We can send a limited supply of your medications to your local pharmacy.     -  We recommend you follow a heart healthy diet. Watch food labels and try not to eat more than 2,500 mg of sodium per day. Avoid foods high in salt like processed meats (lunch meats, parham, and sausage), processed foods (boxed dinners, canned soups), fried and fast foods. Monitor serving sizes and if the sodium per serving size is more than 200 mg, avoid those foods. If the sodium per serving size is between 100-200 mg, you can use those in limited quantities. Try to choose foods where the amount of sodium per serving size is less than 100 mg. Try to eat a diet rich in fruits and " vegetables, whole grains, low fat dairy products, skinless poultry and fish, nuts, beans, non-tropical vegetable oils. Limit saturated fat, trans fat, sodium, red meats, and sugar-sweetened beverages.   Limit alcohol     -The combination of a reduced-calorie diet and increased physical activity is recommended. Adults should aim to get at least 150 minutes of moderate physical activity per week (30 minutes of moderate physical activities at least 5 days per week). Examples of moderate physical activities include brisk walking, swimming, aerobic dancing, heavy gardening, jumping rope, bicycling 10 MPH or faster, tennis, hiking uphill or with a heavy backpack. Please let us know if you would like to learn more about your nutrition and calories and additional options including weight loss programs to help you reach your goal.     -If you smoke, stop smoking. If you stop smoking you can help get rid of a major source of stress to your heart. Smoking makes your heart rate and blood pressure go up and increases your risk or developing cardiovascular diseases and worsen symptoms associated with heart failure.     -Obtain a BP monitor and monitor your BP daily. Check it around the same time each day; at least 1 hour after taking your medications. Record your BP in a log and bring your log with you to your doctors appointment.     -F/u with your PCP as recommended.

## 2024-11-22 NOTE — PROGRESS NOTES
Chief Complaint:   Follow-up and Hypertension     History Of Present Illness:    Jacinto Neri is a 49 y.o. male presenting with  a pertient medical Hx notable for adjustment insomnia, dyslipidemia, hypertension, paroxysmal atrial fibrillation who presents to cardiology for follow up care.     Recall that he had been in his usual state of health. He was getting ready for work when he began to experience intermittent heart palpitations associated with this was a fogginess to his brain, he went to work, where his supervisor felt that he is unwell. EMS was summoned was noted to have hypertensive urgency  For his atrial fibrillation. He was subsequently referred to [facility] were seen in the ER. He received IV labetalol for both blood pressure and rate control. He subsequently converted to normal sinus rhythm and maintained rhythm. He was started on apixaban 5 mg twice daily. He was continued on verapamil 360 mg daily, hydrochlorothiazide 25 mg, spironolactone 25 mg. He was discharged with event monitor..      Since his discharge, he has been doing well/notes of blood pressure has been better controlled. He notes a heart palpitations has also been better controlled. He has reduced and limiting nicotine and caffeine use which she feels has been helpful for him. He voices no additional complaints     Today ( 11/18/2022) he returns for follow up. He has just returned from a family vacation in [facility]. He was noticing that his legs have had a little more irritation and burn to them. Because of symptoms, he stopped amlodipine he feels that maybe his symptoms are related to amlodipine as he thinks the pill became bigger and changed. After this he began to have some of his discomfort.      5/18/2023 -- He returns for follow up. He has not had significant heart issues since the last seen.    11/13 /2023  - He returns for follow up. He has been havig some chest discomfor that can occur at rest or exertion.  He  has been intermittently compliant with his blood pressure medications.     11/4/2024 -- He returns for follow up. It has been a year since he was last seen.  Notes that he has been keeping busy. He has not had any protracted heart palpitations in the recent brandon e and when he gets them, only fleeting at times. He notes that his blood pressures have been higher than normal He has also had some difficulty sleeping,     11/22/2024 --he returns for follow-up.  He notes that his blood pressure continues to be elevated.  He has been trying diet, exercise.  He also notes that his cholesterol level has increased as well.  He completed his echocardiogram and returns for the results. He was also able to complete the testing ordered.       No exacerbating or relieving factors.  Patient denies chest pain and angina.  Pt denies shortness of breath, dyspnea on exertion, orthopnea, and paroxysmal nocturnal dyspnea.  Pt denies worsening lower extremity edema.  Pt denies palpitations or syncope.  No recent falls.  No fever or chills.  No cough.  No change in bowel or bladder habits.  No sick contacts.  No recent travel.       Last Recorded Vitals:  Vitals:    11/22/24 1009   BP: (!) 158/95   BP Location: Right arm   Patient Position: Sitting   BP Cuff Size: Large adult   Pulse: 66   Resp: 18   SpO2: 96%   Weight: 105 kg (231 lb)   Height: 1.829 m (6')           Past Medical History:  He has a past medical history of Blepharitis of left upper eyelid, Blepharitis of right upper eyelid, and Refractive error.    Past Surgical History:  He has a past surgical history that includes Cardiac catheterization (06/25/2013) and Other surgical history (06/25/2013).      Social History:  He reports that he has quit smoking. His smoking use included cigarettes. His smokeless tobacco use includes chew. He reports current alcohol use. He reports that he does not use drugs.    Family History:  No family history on file.      Allergies:  Lisinopril    Outpatient Medications:  Current Outpatient Medications   Medication Instructions    hydroCHLOROthiazide (HYDRODIURIL) 25 mg, oral, Daily    metoprolol succinate XL (TOPROL-XL) 25 mg, oral, Daily    nitroglycerin (NITROSTAT) 0.4 mg, sublingual, Every 5 min PRN, May repeat dose every 5 minutes for up to 3 doses total.    rivaroxaban (XARELTO) 20 mg, oral, Daily    verapamil ER (VERALAN PM) 360 mg, oral, Daily       Physical Exam:  BP (!) 158/95 (BP Location: Right arm, Patient Position: Sitting, BP Cuff Size: Large adult)   Pulse 66   Resp 18   Ht 1.829 m (6')   Wt 105 kg (231 lb)   SpO2 96%   BMI 31.33 kg/m²   BP (!) 158/95 (BP Location: Right arm, Patient Position: Sitting, BP Cuff Size: Large adult)   Pulse 66   Resp 18   Ht 1.829 m (6')   Wt 105 kg (231 lb)   SpO2 96%   BMI 31.33 kg/m²   General:  Patient is awake, alert, and oriented.  Patient is in no acute distress.  HEENT:  Pupils equal and reactive.  Normocephalic.  Moist mucosa.    Neck:  No thyromegaly.  Normal Jugular Venous Pressure.  Cardiovascular:  Regular rate and rhythm.  Normal S1 and S2.  1/6 TODD.  Pulmonary:  Clear to auscultation bilaterally.  Abdomen:  Soft. Non-tender.   Non-distended.  Positive bowel sounds.  Lower Extremities:  2+ pedal pulses. No LE edema.  Neurologic:  Cranial nerves intact.  No focal deficit.   Skin: Skin warm and dry, normal skin turgor.   Psychiatric: Normal affect.            BC -  Lab Results   Component Value Date    WBC 7.0 11/04/2024    HGB 16.3 11/04/2024    HCT 46.5 11/04/2024    MCV 92 11/04/2024     11/04/2024       CMP -  Lab Results   Component Value Date    CALCIUM 9.5 11/04/2024    PROT 7.3 11/04/2024    ALBUMIN 4.1 11/04/2024    AST 14 11/04/2024    ALT 13 11/04/2024    ALKPHOS 58 11/04/2024    BILITOT 1.0 11/04/2024       LIPID PANEL -   Lab Results   Component Value Date    CHOL 213 (H) 11/04/2024    TRIG 180 (H) 11/04/2024    HDL 52.8 11/04/2024    CHHDL  4.0 11/04/2024    VLDL 36 11/04/2024    NHDL 160 (H) 11/04/2024       RENAL FUNCTION PANEL -   Lab Results   Component Value Date    GLUCOSE 90 11/04/2024     11/04/2024    K 4.0 11/04/2024     11/04/2024    CO2 27 11/04/2024    ANIONGAP 14 11/04/2024    BUN 23 11/04/2024    CREATININE 1.02 11/04/2024    GFRMALE >90 04/13/2022    CALCIUM 9.5 11/04/2024    ALBUMIN 4.1 11/04/2024        Lab Results   Component Value Date    BNP 13 04/13/2022    HGBA1C 5.1 02/08/2019       Last Cardiology Tests:  Echocardiogram 11/15/2024  CONCLUSIONS:   1. Left ventricular ejection fraction is normal, calculated by Davis's biplane at 57%.   2. There is normal right ventricular global systolic function.      In office EKG  -- Sinus rhythm 71 bpm  -- Q waves present V1 through V3     Event monitor 04/2022  AnonymAsk 14 day holter monitor    rhythm sinus bradycardia to sinus tachycardia with occurrence of first-degree AV block.  Minimum heart rate 57, maximum heart rate 132, average heart rate 77  954 PVCs burden 0.07%, 70 2P SVC's burden of less than 0.01%, 16 patient reported events correlating with sinus rhythm no additional atrial fibrillation seen.         Lab review: I have personally reviewed the laboratory result(s)   Diagnostic review: I have personally reviewed the result(s) of the EKG and Echocardiogram .   Reports very poor sleep.  Assessment/Plan   Problem List Items Addressed This Visit       Essential (primary) hypertension - Primary    Overview     He has documented angioedema to ACE inhibitors  He had myalgia related to use of Amlodipine-- Stopped             High cholesterol    Overview     The ASCVD Risk score (Raheem DK, et al., 2019) failed to calculate for the following reasons:    Cannot find a previous HDL lab    Cannot find a previous total cholesterol lab  Lab Results   Component Value Date    CHOL 190 02/08/2019     Lab Results   Component Value Date    HDL 47 02/08/2019     Lab Results  "  Component Value Date    LDLCALC 127 02/08/2019     Lab Results   Component Value Date    TRIG 80 02/08/2019     No components found for: \"CHOLHDL\"           Hyperlipemia    Overview     The ASCVD Risk score (Raheem HUI, et al., 2019) failed to calculate for the following reasons:    Risk score cannot be calculated because patient has a medical history suggesting prior/existing ASCVD  Lab Results   Component Value Date    CHOL 190 02/08/2019     Lab Results   Component Value Date    HDL 47 02/08/2019     Lab Results   Component Value Date    LDLCALC 127 02/08/2019     Lab Results   Component Value Date    TRIG 80 02/08/2019     No components found for: \"CHOLHDL\"           Paroxysmal atrial fibrillation (Multi)    Overview     Initial Event 04/2022   [id number] score =1 ( HTN)   --Continue Apixaban at this time   -- Continue Verapamil / Metoprolol for Rate / Rhythm control             Charanjit Palmer DO   Division of Cardiovascular Medicine  [facility] [place]        "

## 2024-11-25 ENCOUNTER — ANCILLARY PROCEDURE (OUTPATIENT)
Dept: CARDIOLOGY | Facility: CLINIC | Age: 49
End: 2024-11-25
Payer: COMMERCIAL

## 2024-11-25 ENCOUNTER — TELEPHONE (OUTPATIENT)
Dept: CARDIOLOGY | Facility: CLINIC | Age: 49
End: 2024-11-25
Payer: COMMERCIAL

## 2024-11-25 PROCEDURE — 93005 ELECTROCARDIOGRAM TRACING: CPT

## 2024-11-25 RX ORDER — OLMESARTAN MEDOXOMIL 5 MG/1
10 TABLET ORAL DAILY
Qty: 60 TABLET | Refills: 11 | Status: SHIPPED | OUTPATIENT
Start: 2024-11-25 | End: 2025-11-25

## 2024-11-25 NOTE — PROGRESS NOTES
Olmesartan Rx'd for Blood pressure control   -- Although allergy to Lisinopril is documented as Angioedema, current data suggests risk of Cross Reaction much lower than previously describe 1.5 - 10% risk     -- In a meta-analysis of data from 19 trials, the overall incidence of ??gi?edema with ?RBs was not significantly different from placebo. This analysis included 35,479 patients (mean age 61 years; 59 percent men) of whom 52 developed ??gi???em? during a mean duration of 120 weeks, for a weighted incidence of 0.11 percent (95% CI 0.09-0.13)  Binu H, Rafael FH, Juan J, et al. [place]-analysis of randomized trials of angioedema as an adverse event of renin-angiotensin system inhibitors. Am J Cardiol 2012; 110:383.      See prior Note as well

## 2024-11-25 NOTE — TELEPHONE ENCOUNTER
Patient called office thought that Dr. Palmer was going to add an additional Blood pressure medication at his visit on Friday 11/22/24.  This was clarified with Dr. Palmer, he did want the patient to start Olmesartan 10mg daily with his hydrochlorothiazide. Spoke with patient to let him know about the addition of the Olmesartan and that the RX was sent to his local pharmacy.  Pt verbalized understanding.

## 2024-11-25 NOTE — ASSESSMENT & PLAN NOTE
In Office  -- Initially 174/ 101 that reduces to 146/98 at the end of the appointment.  This still been difficult historically to control  -- We will obtain a transthoracic echocardiogram for structural evaluation including ejection fraction, assessment of regional wall motion abnormalities or valvular disease, and further evaluation of hemodynamics.  -- May benefir from ARB, especially if echocardiogram shows LVH  -- May benefit from more effective diuretic if RVSP or IVC elevated / Dilated   Olmesartan Rx'd for Blood pressure control   -- Although allergy to Lisinopril is documented as Angioedema, current data suggests risk of Cross Reaction much lower than previously describe 1.5 - 10% risk   -- In a meta-analysis of data from 19 trials, the overall incidence of ??gi?edema with ?RBs was not significantly different from placebo. This analysis included 35,479 patients (mean age 61 years; 59 percent men) of whom 52 developed ??gi???em? during a mean duration of 120 weeks, for a weighted incidence of 0.11 percent (95% CI 0.09-0.13)  Binu H, Rafael RICO, Juan J, et al. [place]-analysis of randomized trials of angioedema as an adverse event of renin-angiotensin system inhibitors. Am J Cardiol 2012; 110:383.

## 2024-11-26 LAB
ATRIAL RATE: 66 BPM
ATRIAL RATE: 67 BPM
P AXIS: 25 DEGREES
P AXIS: 28 DEGREES
P OFFSET: 179 MS
P OFFSET: 187 MS
P ONSET: 121 MS
P ONSET: 131 MS
PR INTERVAL: 182 MS
PR INTERVAL: 196 MS
Q ONSET: 219 MS
Q ONSET: 222 MS
QRS COUNT: 11 BEATS
QRS COUNT: 11 BEATS
QRS DURATION: 86 MS
QRS DURATION: 88 MS
QT INTERVAL: 384 MS
QT INTERVAL: 408 MS
QTC CALCULATION(BAZETT): 405 MS
QTC CALCULATION(BAZETT): 427 MS
QTC FREDERICIA: 398 MS
QTC FREDERICIA: 421 MS
R AXIS: -21 DEGREES
R AXIS: -21 DEGREES
T AXIS: 83 DEGREES
T AXIS: 84 DEGREES
T OFFSET: 414 MS
T OFFSET: 423 MS
VENTRICULAR RATE: 66 BPM
VENTRICULAR RATE: 67 BPM

## 2024-12-09 NOTE — PROGRESS NOTES
"       [facility] Sleep Medicine  [facility]  [address]  [facility] [id number] [phone]     [facility] Sleep Medicine [facility]  New Visit Note    Virtual or Telephone Consent    An interactive audio and video telecommunication system which permits real time communications between the patient (at the originating site) and provider (at the distant site) was utilized to provide this telehealth service.   Verbal consent was requested and obtained from Tank Neri on this date, 12/16/24 for a telehealth visit.        Subjective   Patient ID: Tank Neri \"Nelly" is a 49 y.o. male with past medical history significant for Obesity, Hypertension, A-Fib, Migrans, and Sleep disorder breathing.     12/16/2024: The patient had a virtual visit with me and was referred by Charanjit Palmer DO, for comprehensive sleep medicine evaluation due to suspected sleep apnea, excessive daytime sleepiness/fatigue, and difficulty staying asleep. He has hypertension, and his cardiologist referred him here to discover the underlying issues. He is a light sleeper and quickly falls asleep during work, but it is hard to stay asleep at night, and he wakes up a couple of times. Today, he came here to establish care for a sleep disorder evaluation. His ESS: 5, ESTELIAT: 19  today.      HPI  Patient had been having these symptoms for the past 40 years. Patient never had sleep study done yet.       SLEEP STUDY HISTORY: (personally reviewed raw data such as interpretation report, data sheet, hypnogram, and titration table if available and applicable)  N/A    SLEEP-WAKE SCHEDULE  Bedtime: MN-1 AM  on weekdays, same on weekends  Subjective sleep latency: 15-20 minutes  Problems falling asleep: No  Number of awakenings: 3-4 times per night spontaneously for unknown reasons (1 for BR)  Falls back asleep in  minutes  Problems staying asleep: Yes  Final wake time: 7 AM on " weekdays, 8-9 AM on weekends  Out of bed time: 7:30 AM on weekdays, 8-9 AM on weekends  Shift work: 1st shift  Naps: No  Average sleep duration (excluding naps): 5- 7 hours    SLEEP ENVIRONMENT  Sleep location: bed  Sleep status: sleeps with wife  Room is dark:  Yes  Room is quiet: Yes  Room is cool: Yes  Bed comfort: good    SLEEP HABITS:   Activities before bedtime: watch TV  Activities in bed: no  Preferred sleep position: back, left side, and right side    SLEEP ROS:  Night symptoms: POSITIVE for snoring, nasal congestion , mouth breathing, and night sweats during sleep  Morning symptoms: POSITIVE for unrefreshing sleep and morning dry mouth  Daytime symptoms POSITIVE for excessive daytime sleepiness and trouble remembering things in daytime  Hypersomnia / narcolepsy symptoms: Patient denies symptoms of a hypersomnolence disorder such as sleep paralysis, sleep-related hallucinations, and cataplexy.   RLS symptoms: Patient denies RLS symptoms.  Movements in sleep: Patient denies problematic movements in sleep such as seizures during sleep, frequent leg kicks / jerks while asleep, sleep-related bruxism, and waking up with bedsheets in disarray.  Parasomnia symptoms: POSITIVE for nightmares    WEIGHT: stable    REVIEW OF SYSTEMS: All other systems have been reviewed and are negative.    PERTINENT SOCIAL HISTORY:  Occupation:   Smoking: No   ETOH: Yes  and socially  Marijuana: No   Caffeine: Yes, 1 cup of coffee in the morning  Sleep aids: No   Claustrophobia: No     PERTINENT PAST SURGICAL HISTORY:  non-contributory    PERTINENT FAMILY HISTORY:  loud snoring- father  Insomnia -mother    Active Problems, Allergy List, Medication List, and PMH/PSH/FH/Social Hx have been reviewed and reconciled in chart. No significant changes unless documented in the pertinent chart section. Updates made when necessary.       Objective     REVIEW OF SYSTEMS  All other systems have been reviewed and are  "negative.    ALLERGIES  Allergies   Allergen Reactions    Lisinopril Angioedema and Unknown       MEDICATIONS  Current Outpatient Medications   Medication Sig Dispense Refill    hydroCHLOROthiazide (HYDRODiuril) 25 mg tablet Take 1 tablet (25 mg) by mouth once daily. 90 tablet 3    metoprolol succinate XL (Toprol-XL) 25 mg 24 hr tablet Take 1 tablet (25 mg) by mouth once daily. 90 tablet 3    nitroglycerin (Nitrostat) 0.4 mg SL tablet Place 1 tablet (0.4 mg) under the tongue every 5 minutes if needed for chest pain. May repeat dose every 5 minutes for up to 3 doses total. 100 tablet 11    olmesartan (Benicar) 5 mg tablet Take 2 tablets (10 mg) by mouth once daily. 60 tablet 11    rivaroxaban (Xarelto) 20 mg tablet Take 1 tablet (20 mg) by mouth once daily. 90 tablet 3    verapamil ER (Veralan PM) 360 mg 24 hr capsule Take 1 capsule (360 mg) by mouth once daily. 90 capsule 3     No current facility-administered medications for this visit.         Physical Exam  Constitutional:alert and oriented to time, place, and person    Assessment/Plan   Tank Neri \"Jacinto\" is a 49 y.o. male who is seen to evaluate for possible obstructive sleep apnea. The pathophysiology of sleep apnea, diagnostic testing (HST vs PSG), treatment options (PAP, oral appliance, surgery, hypoglossal nerve stimulator called Inspire), and supportive management (weight loss, positional therapy, smoking cessation, avoidance of alcohol and sedatives) were discussed with the patient in detail. Risk factors of sleep apnea as well as cardiometabolic and neurocognitive sequelae associated with untreated sleep apnea were also discussed. Lastly, patient was advised to avoid driving vehicle or operating heavy machinery when sleepy.     Tank Neri \"Jacinto\" with the following problems:     # SLEEP DISORDERED BREATHING:  -This is likely sleep apnea based on the the history and physical examination.   -Tank Neri \"Jacinto\"has not yet had a sleep study.  -Instruct " "patient to complete home/ in lab/ split night/ titration sleep study.  -HSAT is reasonable as patient likely has SANTIAGO based on history and exam and does not have any of the following comorbidities: CHF, neuromuscular weakness, hypoventilation, or significant COPD.  -We consider treatment as indicated when testing is complete.     # SLEEP APNEA:  -Per CMS criteria, the patient is not eligible for a pap to treat her Obstructive sleep apnea.   -Start/ Continue [] cmH20 []PAP through [facility].  -Sleep apnea and PAP therapy education were provided at length in the clinic today. Tank Neri \"Jacinto\" verbalized understanding.  -Emphasized diet, exercise, and weight loss in the clinic, as were non-supine sleep, avoiding alcohol in the late evening, and driving or operating heavy machinery when sleepy.  Tank Neri \"Jacinto\"verbalizes understanding of the above instructions and risks.    # CHRONIC SLEEP MAINTENANCE INSOMNIA:  -likely due to poor sleep hygiene, and untreated sleep apnea  -ESTELITA: 19  -Sleep hygiene discuss in the clinic.    # HYPERTENSION/ ATRIAL FIBRILLATION:  -Denies Headache, syncope, or dizziness in the clinic.  -Follows with PCP/ Cardiology     # OBESITY :  -with a BMI of 31.33. Tank Neri \"Jacinto\" most recent Bicarbonate was 27  Bicarbonate   Date Value Ref Range Status   11/04/2024 27 21 - 32 mmol/L Final   -Encourage to have regular exercise to manage weight well.    # NASAL CONGESTION:  -Instruct Tank Neri \"Jacinto\"to use appropriate Flonase spray to ease congestion.    # XEROSTOMIA:  -Instruct Tank eNri \"Jacinto\"to purchase the Biotene gel to ease the dry mouth symptom,     RTC 2-3 weeks after sleep study       All of patient's questions were answered. He verbalizes understanding and agreement with my assessment and plan.  "

## 2024-12-13 ENCOUNTER — APPOINTMENT (OUTPATIENT)
Dept: FAMILY MEDICINE | Age: 49
End: 2024-12-13

## 2024-12-13 VITALS
WEIGHT: 255.18 LBS | SYSTOLIC BLOOD PRESSURE: 121 MMHG | TEMPERATURE: 97.5 F | HEART RATE: 73 BPM | BODY MASS INDEX: 36.61 KG/M2 | OXYGEN SATURATION: 99 % | DIASTOLIC BLOOD PRESSURE: 71 MMHG

## 2024-12-13 DIAGNOSIS — E66.09 CLASS 2 OBESITY DUE TO EXCESS CALORIES WITHOUT SERIOUS COMORBIDITY WITH BODY MASS INDEX (BMI) OF 36.0 TO 36.9 IN ADULT: Primary | ICD-10-CM

## 2024-12-13 DIAGNOSIS — M92.522 OSGOOD-SCHLATTER'S DISEASE OF LEFT LOWER EXTREMITY: ICD-10-CM

## 2024-12-13 DIAGNOSIS — E66.812 CLASS 2 OBESITY DUE TO EXCESS CALORIES WITHOUT SERIOUS COMORBIDITY WITH BODY MASS INDEX (BMI) OF 36.0 TO 36.9 IN ADULT: Primary | ICD-10-CM

## 2024-12-13 ASSESSMENT — PATIENT HEALTH QUESTIONNAIRE - PHQ9
SUM OF ALL RESPONSES TO PHQ9 QUESTIONS 1 AND 2: 0
CLINICAL INTERPRETATION OF PHQ2 SCORE: NO FURTHER SCREENING NEEDED
2. FEELING DOWN, DEPRESSED OR HOPELESS: NOT AT ALL
1. LITTLE INTEREST OR PLEASURE IN DOING THINGS: NOT AT ALL
SUM OF ALL RESPONSES TO PHQ9 QUESTIONS 1 AND 2: 0

## 2024-12-16 ENCOUNTER — APPOINTMENT (OUTPATIENT)
Dept: ORTHOPEDICS | Age: 49
End: 2024-12-16

## 2024-12-16 ENCOUNTER — OFFICE VISIT (OUTPATIENT)
Dept: SLEEP MEDICINE | Facility: HOSPITAL | Age: 49
End: 2024-12-16
Payer: COMMERCIAL

## 2024-12-16 DIAGNOSIS — I10 ESSENTIAL (PRIMARY) HYPERTENSION: ICD-10-CM

## 2024-12-16 DIAGNOSIS — E66.9 OBESITY (BMI 30-39.9): ICD-10-CM

## 2024-12-16 DIAGNOSIS — R29.818 SUSPECTED SLEEP APNEA: ICD-10-CM

## 2024-12-16 DIAGNOSIS — I48.0 PAROXYSMAL ATRIAL FIBRILLATION (MULTI): ICD-10-CM

## 2024-12-16 DIAGNOSIS — G47.30 SLEEP-RELATED BREATHING DISORDER: Primary | ICD-10-CM

## 2024-12-16 PROCEDURE — 99204 OFFICE O/P NEW MOD 45 MIN: CPT

## 2024-12-16 PROCEDURE — 99214 OFFICE O/P EST MOD 30 MIN: CPT | Mod: 24,95

## 2024-12-16 ASSESSMENT — SLEEP AND FATIGUE QUESTIONNAIRES
HOW LIKELY ARE YOU TO NOD OFF OR FALL ASLEEP WHILE WATCHING TV: SLIGHT CHANCE OF DOZING
DIFFICULTY_STAYING_ASLEEP: SEVERE
SITING INACTIVE IN A PUBLIC PLACE LIKE A CLASS ROOM OR A MOVIE THEATER: WOULD NEVER DOZE
SLEEP_PROBLEM_NOTICEABLE_TO_OTHERS: MUCH
SLEEP_PROBLEM_INTERFERES_DAILY_ACTIVITIES: MUCH
HOW LIKELY ARE YOU TO NOD OFF OR FALL ASLEEP WHEN YOU ARE A PASSENGER IN A CAR FOR AN HOUR WITHOUT A BREAK: WOULD NEVER DOZE
HOW LIKELY ARE YOU TO NOD OFF OR FALL ASLEEP WHILE SITTING AND READING: HIGH CHANCE OF DOZING
HOW LIKELY ARE YOU TO NOD OFF OR FALL ASLEEP IN A CAR, WHILE STOPPED FOR A FEW MINUTES IN TRAFFIC: WOULD NEVER DOZE
WAKING_TOO_EARLY: SEVERE
ESS-CHAD TOTAL SCORE: 5
DIFFICULTY_FALLING_ASLEEP: MILD
HOW LIKELY ARE YOU TO NOD OFF OR FALL ASLEEP WHILE SITTING AND TALKING TO SOMEONE: WOULD NEVER DOZE
HOW LIKELY ARE YOU TO NOD OFF OR FALL ASLEEP WHILE LYING DOWN TO REST IN THE AFTERNOON WHEN CIRCUMSTANCES PERMIT: SLIGHT CHANCE OF DOZING
HOW LIKELY ARE YOU TO NOD OFF OR FALL ASLEEP WHILE SITTING QUIETLY AFTER LUNCH WITHOUT ALCOHOL: WOULD NEVER DOZE
SATISFACTION_WITH_CURRENT_SLEEP_PATTERN: DISSATISFIED
WORRIED_DISTRESSED_DUE_TO_SLEEP: MUCH

## 2024-12-16 ASSESSMENT — PATIENT HEALTH QUESTIONNAIRE - PHQ9
2. FEELING DOWN, DEPRESSED OR HOPELESS: NOT AT ALL
SUM OF ALL RESPONSES TO PHQ9 QUESTIONS 1 AND 2: 0
1. LITTLE INTEREST OR PLEASURE IN DOING THINGS: NOT AT ALL

## 2024-12-16 NOTE — PATIENT INSTRUCTIONS
"       [facility] Sleep Medicine  [facility]  [address]  [facility] [id number] [phone]       Thank you for coming to the Sleep Medicine Clinic today! Your sleep medicine provider today was: SOLE Blackwell Below is a summary of your treatment plan, patient education, other important information, and our contact numbers.    Dear  / Ms Tank Neri       Your Sleep Provider Today: SOLE Blackwell  Your Primary Care Physician: No Assigned PCP Generic Provider, MD   Your Referring Provider: Charanjit Palmer DO      Thank you for visiting  Sleep Medicine Clinic !     1. We will proceed with a HOME sleep study to check your risk of sleep apnea. The lab will call you and schedule it for you.     2. Please do not drive when you are sleepy and start practicing the sleep hygiene as discussed in clinic.    3. FOR QUESTIONS AND CONCERNS:   a) : To schedule, cancel, or reschedule SLEEP STUDY appointments, please call [phone]  b): Please call my office with issues or questions: [phone] (Ritika); [phone] (Elizabeth); [phone] (Kym)    In the event that you are running more than 10 minutes late to your appointment, I will kindly ask you to reschedule. Thanks.      TREATMENT PLAN     - Do the following testing: home sleep apnea test  - Please read the \"Patient Education\" section below for more detailed information. Try implementing tips, reminders, strategies, and supportive management.   - If not yet done, please sign up for [employer] to make a future schedule, send prescription requests, or send messages.    Follow-up Appointment:   Follow-up in 2-3 weeks after sleep study.    PATIENT EDUCATION     OBSTRUCTIVE SLEEP APNEA (SANTIAGO) is a sleep disorder where your upper airway muscles relax during sleep and the airway intermittently and repetitively narrows and collapses leading to partially blocked airway (hypopnea) or " completely blocked airway (apnea) which, in turn, can disrupt breathing in sleep, lower oxygen levels while you sleep and cause night time wakings. Because both apnea and hypopnea may cause higher carbon dioxide or low oxygen levels, untreated SANTIAGO can lead to heart arrhythmia, elevation of blood pressure, and make it harder for the body to consolidate memory and facilitate metabolism (leading to higher blood sugars at night). Frequent partial arousals occur during sleep resulting in sleep deprivation and daytime sleepiness. SANTIAGO is associated with an increased risk of cardiovascular disease, stroke, hypertension, and insulin resistance. Moreover, untreated SANTIAGO with excessive daytime sleepiness can increase the risk of motor vehicular accidents.    Below are conservative strategies for SANTIAGO regardless of SANTIAGO severity are:   Positional therapy - Avoid sleeping on your back.   Healthy diet and regular exercise to optimize weight is highly encouraged.   Avoid alcohol late in the evening and sedative-hypnotics as these substances can make sleep apnea worse.   Improve breathing through the nose with intranasal steroid spray, saline rinse, or antihistamines    Safety: Avoid driving vehicle and operating heavy equipment while sleepy. Drowsy driving may lead to life-threatening motor vehicle accidents. A person driving while sleepy is 5 times more likely to have an accident. If you feel sleepy, pull over and take a short power nap (sleep for less than 30 minutes). Otherwise, ask somebody to drive you.    Treatment options for sleep apnea include weight management, positional therapy, Positive Airway Therapy (PAP) therapy, oral appliance therapy, hypoglossal nerve stimulator (Inspire) and select airway surgeries.    Sleep Testing for sleep apnea: The best and ideal way to check out if you have sleep apnea is to do an overnight sleep study in the sleep laboratory. Alternatively, a home sleep apnea test can also be done depending  on your insurance and risk factors.     If you are having a home sleep apnea test, kindly allot 1 hour during pickup of the testing kit as you will have to complete paperwork and listen to the sleep technician for in-person on-the-spot demonstration and instructions on how to hook up the testing kit at home. Do the test for 1 day and start off with sleeping on your back. If you sleep on your side in the middle of night or you have always been a side or stomach-sleeper, it is ok as long as you have some time on your back and off-back.     If you are having an overnight in-lab sleep study, please make sure to bring toiletries, a comfy pillow, additional warm blankets, and any nighttime medications (include as-needed inhaler, pain pill, etc) that you may regularly take. Also, be sure to eat dinner before you arrive as we generally do not provide meals inside the sleep testing center. Lastly, in order to fall asleep faster in the sleep testing center, we advise patients to wake up 2 hours earlier on the morning of scheduled testing and avoid napping 2 days prior testing. Sometimes, your sleep provider may prescribe a sleep aid to be taken at lights out in the sleep testing center. If you are taking a sleep aid, consider having somebody pick you up after the sleep testing.    Overnight sleep studies may be scheduled on a weekday or weekend. We also perform daytime testing for shift workers on a case-by-case basis.    Once you have booked an appointment to do the sleep study, please contact my office for follow-up visit to discuss results.    On the other hand, if you have any of the following, please consider calling the sleep testing center to RESCHEDULE your sleep study appointment:  If you tested positive for COVID within 10 days of your sleep study appointment.  If you were exposed to somebody who was confirmed for COVID within 10 days of your sleep study appointment and now you are having symptoms of possible  COVID  If you have fever>100F or any acute symptoms that you think will lead to poor sleep during testing (e.g. new or worsening stuffy nose not relieved by steroid nasal spray)  If you have traveled domestically or internationally in the last month and now you are having symptoms of possible COVID    How to use nasal sprays properly: If you have nasal congestion or allergies, improving your breathing through the nose is critical for treating sleep apnea and improving your sleep. Hence, intranasal steroid spray like Flonase or Nasocort (generic name is Fluticasone) might help. In some patients with sleep apnea, using intranasal steroid spray allowed them to tolerate CPAP mask better.     Intranasal steroids are usually prescribed as 2 sprays per nostril 1 hour before bedtime. If you administer intranasal steroids too close to bedtime, it might not work as well.  If you have nasal congestion or allergies during day despite using Flonase at night, try adding Azelastine nasal spray 2 sprays per nostril in the morning. Alternatively, can consider adding over-the-counter non-sedating antihistamine medications.     However, if you have severe nasal congestion or allergies despite using both nasal sprays, consider seeing an allergy specialist to confirm which substance you are sensitive to and get definitive treatment which may be in the form of injections, oral pills, different kind of nose sprays, and/or a combination of everything said.     Below are instructions on how to use intranasal steroid spray properly:  Sit up or stand up with your face down.  Shake the spray bottle and insert its tip in one nostril.  Point the spray tip towards your ear, and not towards the middle of your nose. Pointing the tip upward and in the middle of the nose can lead to discomfort and irritation of nasal mucosa which can lead to nose bleeding or coughing up tinges of blood.  Squeeze the spray bottle and administer the recommended amount  of spray.   Don't sniff when you spray. Instead, remove the spray bottle and pinch your nose for 10 seconds.   Perform steps 1-6 on the other nostril.    You can also go to the following EDUCATION WEBSITES for further information:   American Academy of Sleep Medicine http://sleepeducation.org  National Sleep Foundation: https://sleepfoundation.org  American Sleep Apnea Association: https://www.sleepapnea.org (for patients with sleep apnea)  Narcolepsy Network: https://www.narcolepsynetwork.org (for patients with narcolepsy)  [employer]: https://www.InD[employer] Pharmaceuticalspsy.org (for patients with narcolepsy)  Hypersomnia Foundation: https://www.hypersomniafoundation.org (for patients with idiopathic hypersomnia)  RLS foundation: https://www.rls.org (for patients with restless leg syndrome)    IMPORTANT INFORMATION     Call 911 for medical emergencies.  Our offices are generally open from Monday-Friday, 8 am - 5 pm.   There are no supporting services by either the sleep doctors or their staff on weekends and Holidays, or after 5 PM on weekdays.   If you need to get in touch with me, you may either call my office number or you can use [place].  If a referral for a test, for CPAP, or for another specialist was made, and you have not heard about scheduling this within a week, please call scheduling at [phone] ([phone]).  If you are unable to make your appointment for clinic or an overnight study, kindly call the office or sleep testing center at least 48 hours in advance to cancel and reschedule.  If you are on CPAP, please bring your device's card and/or the device to each clinic appointment.   In case of problems with PAP machine or mask interface, please contact your [employer] (Durable Medical Equipment) company first. [employer] is the company who provides you the machine and/or PAP supplies.       PRESCRIPTIONS     We require 7 days advanced notice for prescription refills. If we do not receive the request in this time, we  cannot guarantee that your medication will be refilled in time.    IMPORTANT PHONE NUMBERS     Sleep Medicine Clinic Fax: [phone]  Appointments (for Pediatric Sleep Clinic): [phone] - option 1  Appointments (for Adult Sleep Clinic): [phone] - option 2  Appointments (For Sleep Studies): [phone] - option 3  Behavioral Sleep Medicine: [phone]  Sleep Surgery: [phone]  Nutrition Service: [phone]  Weight management clinics with endocrinology: [phone]  Bariatric Services: [phone] (includes weight loss medications and weight loss surgery)  [facility]: [phone] (offers holistic approaches to weight management)  ENT (Otolaryngology): [phone]  Headache Clinic (Neurology): [phone]  Neurology: [phone]  Psychiatry: [phone]  Pulmonary Function Testing (PFT) Center: [phone]  Pulmonary Medicine: [phone]  [facility] ([facility] - Financial Reconciliation): [phone]      OUR SLEEP TESTING LOCATIONS     Our team will contact you to schedule your sleep study, however, you can contact us as follow:  Main Phone Line (scheduling only): [phone]), option 3  Adult and Pediatric Locations  [facility] (6 years and older): [facility] - 4th floor ([address] [place] [address]) After hours line: [phone]  [facility] (Main campus: All ages): [facility], 6th floor. After hours line: [phone]   [place] (5 years and older; younger considered on case-by-case basis): [address]; [address], [address]. Scheduling  After hours line: [phone]   [place] (6 years and older): [address]; [address]; [address]   [place] (6 years and older): [address] [facility], [address]  After hours line: [phone]   [place] (13 years and older) in [place]: [address] [facility], 2nd floor  After hours line: [phone]  [facility] (13 year and  "older): [address]  After hours line: [phone]     Adult Only Locations:   [place] (18 years and older): [address] [facility], 2nd floor   [place] (18 years and older): [address] [facility]; 4th floor  After hours line: [phone]   [place] (18 years and older) at [place]: [address] [facility]  After hours line: [phone]     CONTACTING YOUR SLEEP MEDICINE PROVIDER AND SLEEP TEAM      For issues with your machine or mask interface, please call your DME provider first. DME stands for durable medical company. [employer] is the company who provides you the machine and/or PAP supplies / accessories.   To schedule, cancel, or reschedule SLEEP STUDY APPOINTMENTS, please call the Main Phone Line at [phone] - option 3.   To schedule, cancel, or reschedule CLINIC APPOINTMENTS, you can do it in \"MyChart\", call [phone] to speak with my  (oScorro Guerra), or call the Main Phone Line at [phone] - option 2  For CLINICAL QUESTIONS or MEDICATION REFILLS, please call direct line for Adult Sleep Nurses at [phone].   Lastly, you can also send a message directly to your provider through \"My Chart\", which is the email service through your  Records Account: [email or web address]       Here at [facility], we wish you a restful sleep!   "

## 2024-12-20 ENCOUNTER — E-ADVICE (OUTPATIENT)
Dept: FAMILY MEDICINE | Age: 49
End: 2024-12-20

## 2024-12-20 ENCOUNTER — TELEPHONE (OUTPATIENT)
Dept: FAMILY MEDICINE | Age: 49
End: 2024-12-20

## 2024-12-20 DIAGNOSIS — E66.09 CLASS 2 OBESITY DUE TO EXCESS CALORIES WITHOUT SERIOUS COMORBIDITY WITH BODY MASS INDEX (BMI) OF 36.0 TO 36.9 IN ADULT: ICD-10-CM

## 2024-12-20 DIAGNOSIS — E66.812 CLASS 2 OBESITY DUE TO EXCESS CALORIES WITHOUT SERIOUS COMORBIDITY WITH BODY MASS INDEX (BMI) OF 36.0 TO 36.9 IN ADULT: ICD-10-CM

## 2024-12-23 ENCOUNTER — IMAGING SERVICES (OUTPATIENT)
Dept: GENERAL RADIOLOGY | Age: 49
End: 2024-12-23
Attending: ORTHOPAEDIC SURGERY

## 2024-12-23 ENCOUNTER — APPOINTMENT (OUTPATIENT)
Dept: ORTHOPEDICS | Age: 49
End: 2024-12-23

## 2024-12-23 VITALS — HEIGHT: 70 IN | BODY MASS INDEX: 36.51 KG/M2 | WEIGHT: 255 LBS

## 2024-12-23 DIAGNOSIS — M92.522 OSGOOD-SCHLATTER'S DISEASE OF LEFT LOWER EXTREMITY: Primary | ICD-10-CM

## 2024-12-23 DIAGNOSIS — R52 PAIN: ICD-10-CM

## 2024-12-23 PROCEDURE — 73564 X-RAY EXAM KNEE 4 OR MORE: CPT | Performed by: ORTHOPAEDIC SURGERY

## 2024-12-23 PROCEDURE — 99203 OFFICE O/P NEW LOW 30 MIN: CPT | Performed by: ORTHOPAEDIC SURGERY

## 2024-12-23 RX ORDER — CELECOXIB 200 MG/1
200 CAPSULE ORAL DAILY
Qty: 30 CAPSULE | Refills: 1 | Status: SHIPPED | OUTPATIENT
Start: 2024-12-23 | End: 2024-12-27 | Stop reason: SDUPTHER

## 2024-12-24 ENCOUNTER — E-ADVICE (OUTPATIENT)
Dept: ORTHOPEDICS | Age: 49
End: 2024-12-24

## 2024-12-26 ENCOUNTER — TELEPHONE (OUTPATIENT)
Dept: ORTHOPEDICS | Age: 49
End: 2024-12-26

## 2024-12-27 DIAGNOSIS — M92.522 OSGOOD-SCHLATTER'S DISEASE OF LEFT LOWER EXTREMITY: ICD-10-CM

## 2024-12-27 RX ORDER — CELECOXIB 200 MG/1
200 CAPSULE ORAL DAILY
Qty: 30 CAPSULE | Refills: 1 | Status: SHIPPED | OUTPATIENT
Start: 2024-12-27 | End: 2024-12-28 | Stop reason: ALTCHOICE

## 2024-12-28 RX ORDER — MELOXICAM 15 MG/1
15 TABLET ORAL DAILY
Qty: 30 TABLET | Refills: 1 | Status: SHIPPED | OUTPATIENT
Start: 2024-12-28

## 2025-01-03 ENCOUNTER — E-ADVICE (OUTPATIENT)
Dept: REHABILITATION | Age: 50
End: 2025-01-03

## 2025-01-10 ENCOUNTER — APPOINTMENT (OUTPATIENT)
Dept: REHABILITATION | Age: 50
End: 2025-01-10

## 2025-01-10 DIAGNOSIS — M25.562 CHRONIC PAIN OF LEFT KNEE: Primary | ICD-10-CM

## 2025-01-10 DIAGNOSIS — M92.522 OSGOOD-SCHLATTER'S DISEASE OF LEFT LOWER EXTREMITY: ICD-10-CM

## 2025-01-10 DIAGNOSIS — G89.29 CHRONIC PAIN OF LEFT KNEE: Primary | ICD-10-CM

## 2025-01-10 DIAGNOSIS — R52 PAIN: ICD-10-CM

## 2025-01-10 DIAGNOSIS — R26.89 IMPAIRMENT OF BALANCE: ICD-10-CM

## 2025-01-10 DIAGNOSIS — R29.898 WEAKNESS OF LEFT HIP: ICD-10-CM

## 2025-01-10 ASSESSMENT — MOVEMENT AND STRENGTH ASSESSMENTS
PERFORMING HEAVY ACTIVITIES AROUND YOUR HOME: A LITTLE BIT OF DIFFICULTY
TOTAL SCORE: 65
ROLLING OVER IN BED: NO DIFFICULTY
ANY OF YOUR USUAL WORK, HOUSEWORK OR SCHOOL ACTIVITIES: A LITTLE BIT OF DIFFICULTY
PUTTING ON YOUR SHOES OR SOCKS: MODERATE DIFFICULTY
WALKING 2 BLOCKS: A LITTLE BIT OF DIFFICULTY
YOUR USUAL HOBBIES, RECREATIONAL OR SPORTING ACTIVIITIES: A LITTLE BIT OF DIFFICULTY
RUNNING ON UNEVEN GROUND: MODERATE DIFFICULTY
WALKING BETWEEN ROOMS: A LITTLE BIT OF DIFFICULTY
HOPPING: MODERATE DIFFICULTY
PERFORMING LIGHT ACTIVITES AROUND YOUR HOME: A LITTLE BIT OF DIFFICULTY
MAKING SHARP TURNS WHILE RUNNING FAST: MODERATE DIFFICULTY
RUNNING ON EVEN GROUND: MODERATE DIFFICULTY
STANDING FOR 1 HOUR: MODERATE DIFFICULTY
SITTING FOR 1 HOUR: NO DIFFICULTY
GOING UP OR DOWN 10 STAIRS (ABOUT 1 FLIGHT OF STAIRS): MODERATE DIFFICULTY
SQUATTING: MODERATE DIFFICULTY
GETTING INTO OR OUT OF THE BATH: A LITTLE BIT OF DIFFICULTY
WALKING A MILE: A LITTLE BIT OF DIFFICULTY
LIFTING AN OBJECT, LIKE A BAG OF GROCERIES, FROM THE FLOOR: MODERATE DIFFICULTY
GETTING INTO OR OUT OF A CAR: MODERATE DIFFICULTY

## 2025-01-10 ASSESSMENT — ENCOUNTER SYMPTOMS
PAIN SEVERITY NOW: 0
ALLEVIATING FACTORS: PRESCRIPTION MEDICATIONS
PAIN SCALE AT HIGHEST: 7
PAIN DESCRIPTION: DULL

## 2025-01-17 ENCOUNTER — APPOINTMENT (OUTPATIENT)
Dept: REHABILITATION | Age: 50
End: 2025-01-17

## 2025-01-17 DIAGNOSIS — R29.898 WEAKNESS OF LEFT HIP: ICD-10-CM

## 2025-01-17 DIAGNOSIS — G89.29 CHRONIC PAIN OF LEFT KNEE: Primary | ICD-10-CM

## 2025-01-17 DIAGNOSIS — R26.89 IMPAIRMENT OF BALANCE: ICD-10-CM

## 2025-01-17 DIAGNOSIS — M25.562 CHRONIC PAIN OF LEFT KNEE: Primary | ICD-10-CM

## 2025-01-17 DIAGNOSIS — M92.522 OSGOOD-SCHLATTER'S DISEASE OF LEFT LOWER EXTREMITY: ICD-10-CM

## 2025-01-24 ENCOUNTER — APPOINTMENT (OUTPATIENT)
Dept: REHABILITATION | Age: 50
End: 2025-01-24

## 2025-01-24 DIAGNOSIS — R29.898 WEAKNESS OF LEFT HIP: ICD-10-CM

## 2025-01-24 DIAGNOSIS — R26.89 IMPAIRMENT OF BALANCE: ICD-10-CM

## 2025-01-24 DIAGNOSIS — G89.29 CHRONIC PAIN OF LEFT KNEE: Primary | ICD-10-CM

## 2025-01-24 DIAGNOSIS — M25.562 CHRONIC PAIN OF LEFT KNEE: Primary | ICD-10-CM

## 2025-01-24 DIAGNOSIS — M92.522 OSGOOD-SCHLATTER'S DISEASE OF LEFT LOWER EXTREMITY: ICD-10-CM

## 2025-01-24 ASSESSMENT — ENCOUNTER SYMPTOMS: PAIN SEVERITY NOW: 0

## 2025-01-25 DIAGNOSIS — E66.812 CLASS 2 OBESITY DUE TO EXCESS CALORIES WITHOUT SERIOUS COMORBIDITY WITH BODY MASS INDEX (BMI) OF 36.0 TO 36.9 IN ADULT: ICD-10-CM

## 2025-01-25 DIAGNOSIS — E66.09 CLASS 2 OBESITY DUE TO EXCESS CALORIES WITHOUT SERIOUS COMORBIDITY WITH BODY MASS INDEX (BMI) OF 36.0 TO 36.9 IN ADULT: ICD-10-CM

## 2025-01-25 RX ORDER — SEMAGLUTIDE 1.7 MG/.75ML
INJECTION, SOLUTION SUBCUTANEOUS
Qty: 3 ML | Refills: 1 | Status: SHIPPED | OUTPATIENT
Start: 2025-01-25

## 2025-01-31 ENCOUNTER — APPOINTMENT (OUTPATIENT)
Dept: REHABILITATION | Age: 50
End: 2025-01-31

## 2025-01-31 DIAGNOSIS — G89.29 CHRONIC PAIN OF LEFT KNEE: Primary | ICD-10-CM

## 2025-01-31 DIAGNOSIS — M25.562 CHRONIC PAIN OF LEFT KNEE: Primary | ICD-10-CM

## 2025-01-31 DIAGNOSIS — R26.89 IMPAIRMENT OF BALANCE: ICD-10-CM

## 2025-01-31 DIAGNOSIS — R29.898 WEAKNESS OF LEFT HIP: ICD-10-CM

## 2025-01-31 DIAGNOSIS — M92.522 OSGOOD-SCHLATTER'S DISEASE OF LEFT LOWER EXTREMITY: ICD-10-CM

## 2025-02-07 ENCOUNTER — APPOINTMENT (OUTPATIENT)
Dept: REHABILITATION | Age: 50
End: 2025-02-07

## 2025-02-07 ENCOUNTER — CLINICAL SUPPORT (OUTPATIENT)
Dept: SLEEP MEDICINE | Facility: CLINIC | Age: 50
End: 2025-02-07
Payer: COMMERCIAL

## 2025-02-07 DIAGNOSIS — G47.30 SLEEP-RELATED BREATHING DISORDER: ICD-10-CM

## 2025-02-07 NOTE — PROGRESS NOTES
Type of Study: HOME SLEEP STUDY - NOMAD     The patient received equipment and instructions for use of the [facility] [facility] Nomad HSAT device. The patient was instructed how to apply the effort belts, cannula, thermistor. It was also explained how the Nomad and oximeter components work.  The patient was asked to record their sleep for at least a 6 hour period.     The patient was informed of their responsibility for the device and acknowledged this by signing the HSAT device contract. The patient was asked to return the device by 2/10/2025 between the hours of 8am to 3pm to the Sleep Center in [place].     The patient was instructed to call 911 as usual for any medical- emergencies while at home.  The patient was also given a phone number for troubleshooting when using the device in case there were additional questions.

## 2025-02-14 ENCOUNTER — APPOINTMENT (OUTPATIENT)
Dept: REHABILITATION | Age: 50
End: 2025-02-14

## 2025-02-14 ENCOUNTER — E-ADVICE (OUTPATIENT)
Dept: ORTHOPEDICS | Age: 50
End: 2025-02-14

## 2025-02-14 DIAGNOSIS — G89.29 CHRONIC PAIN OF LEFT KNEE: Primary | ICD-10-CM

## 2025-02-14 DIAGNOSIS — M25.562 CHRONIC PAIN OF LEFT KNEE: Primary | ICD-10-CM

## 2025-02-14 DIAGNOSIS — M92.522 OSGOOD-SCHLATTER'S DISEASE OF LEFT LOWER EXTREMITY: ICD-10-CM

## 2025-02-14 DIAGNOSIS — R29.898 WEAKNESS OF LEFT HIP: ICD-10-CM

## 2025-02-14 ASSESSMENT — ENCOUNTER SYMPTOMS
PAIN SCALE AT LOWEST: 0
PAIN SEVERITY NOW: 2
PAIN SCALE AT HIGHEST: 7

## 2025-03-07 ENCOUNTER — APPOINTMENT (OUTPATIENT)
Dept: FAMILY MEDICINE | Age: 50
End: 2025-03-07

## 2025-03-07 RX ORDER — PANTOPRAZOLE SODIUM 40 MG/1
40 TABLET, DELAYED RELEASE ORAL DAILY
Qty: 30 TABLET | Refills: 1 | Status: CANCELLED | OUTPATIENT
Start: 2025-03-07

## 2025-03-18 DIAGNOSIS — I10 ESSENTIAL HYPERTENSION: ICD-10-CM

## 2025-03-19 RX ORDER — IRBESARTAN 150 MG/1
150 TABLET ORAL DAILY
Qty: 90 TABLET | Refills: 1 | Status: SHIPPED | OUTPATIENT
Start: 2025-03-19

## 2025-04-22 ENCOUNTER — LAB (OUTPATIENT)
Dept: LAB | Facility: HOSPITAL | Age: 50
End: 2025-04-22
Payer: COMMERCIAL

## 2025-04-22 ENCOUNTER — APPOINTMENT (OUTPATIENT)
Dept: CARDIOLOGY | Facility: CLINIC | Age: 50
End: 2025-04-22
Payer: COMMERCIAL

## 2025-04-25 LAB
ALBUMIN SERPL-MCNC: 4.5 G/DL (ref 3.6–5.1)
ALP SERPL-CCNC: 61 U/L (ref 35–144)
ALT SERPL-CCNC: 16 U/L (ref 9–46)
ANION GAP SERPL CALCULATED.4IONS-SCNC: 9 MMOL/L (CALC) (ref 7–17)
AST SERPL-CCNC: 14 U/L (ref 10–35)
BILIRUB SERPL-MCNC: 0.9 MG/DL (ref 0.2–1.2)
BUN SERPL-MCNC: 20 MG/DL (ref 7–25)
C1INH FUNCTIONAL/C1INH TOTAL MFR SERPL: NORMAL %
C4 SERPL-MCNC: 28 MG/DL (ref 15–53)
CALCIUM SERPL-MCNC: 9.7 MG/DL (ref 8.6–10.3)
CHLORIDE SERPL-SCNC: 102 MMOL/L (ref 98–110)
CHOLEST SERPL-MCNC: 276 MG/DL
CHOLEST/HDLC SERPL: 4.6 (CALC)
CO2 SERPL-SCNC: 26 MMOL/L (ref 20–32)
CREAT SERPL-MCNC: 0.9 MG/DL (ref 0.7–1.3)
EGFRCR SERPLBLD CKD-EPI 2021: 103 ML/MIN/1.73M2
ERYTHROCYTE [DISTWIDTH] IN BLOOD BY AUTOMATED COUNT: 12.4 % (ref 11–15)
GLUCOSE SERPL-MCNC: 97 MG/DL (ref 65–139)
HCT VFR BLD AUTO: 45.5 % (ref 38.5–50)
HDLC SERPL-MCNC: 60 MG/DL
HGB BLD-MCNC: 16 G/DL (ref 13.2–17.1)
LDLC SERPL CALC-MCNC: 169 MG/DL (CALC)
LPA SERPL-SCNC: <10 NMOL/L
MCH RBC QN AUTO: 32.3 PG (ref 27–33)
MCHC RBC AUTO-ENTMCNC: 35.2 G/DL (ref 32–36)
MCV RBC AUTO: 91.9 FL (ref 80–100)
NONHDLC SERPL-MCNC: 216 MG/DL (CALC)
PLATELET # BLD AUTO: 238 THOUSAND/UL (ref 140–400)
PMV BLD REES-ECKER: 11.7 FL (ref 7.5–12.5)
POTASSIUM SERPL-SCNC: 3.9 MMOL/L (ref 3.5–5.3)
PROT SERPL-MCNC: 7.4 G/DL (ref 6.1–8.1)
RBC # BLD AUTO: 4.95 MILLION/UL (ref 4.2–5.8)
SODIUM SERPL-SCNC: 137 MMOL/L (ref 135–146)
TRIGL SERPL-MCNC: 287 MG/DL
WBC # BLD AUTO: 7.1 THOUSAND/UL (ref 3.8–10.8)

## 2025-04-28 LAB
ALBUMIN SERPL-MCNC: 4.5 G/DL (ref 3.6–5.1)
ALP SERPL-CCNC: 61 U/L (ref 35–144)
ALT SERPL-CCNC: 16 U/L (ref 9–46)
ANION GAP SERPL CALCULATED.4IONS-SCNC: 9 MMOL/L (CALC) (ref 7–17)
AST SERPL-CCNC: 14 U/L (ref 10–35)
BILIRUB SERPL-MCNC: 0.9 MG/DL (ref 0.2–1.2)
BUN SERPL-MCNC: 20 MG/DL (ref 7–25)
C1INH FUNCTIONAL/C1INH TOTAL MFR SERPL: >100 %
C4 SERPL-MCNC: 28 MG/DL (ref 15–53)
CALCIUM SERPL-MCNC: 9.7 MG/DL (ref 8.6–10.3)
CHLORIDE SERPL-SCNC: 102 MMOL/L (ref 98–110)
CHOLEST SERPL-MCNC: 276 MG/DL
CHOLEST/HDLC SERPL: 4.6 (CALC)
CO2 SERPL-SCNC: 26 MMOL/L (ref 20–32)
CREAT SERPL-MCNC: 0.9 MG/DL (ref 0.7–1.3)
EGFRCR SERPLBLD CKD-EPI 2021: 103 ML/MIN/1.73M2
ERYTHROCYTE [DISTWIDTH] IN BLOOD BY AUTOMATED COUNT: 12.4 % (ref 11–15)
GLUCOSE SERPL-MCNC: 97 MG/DL (ref 65–139)
HCT VFR BLD AUTO: 45.5 % (ref 38.5–50)
HDLC SERPL-MCNC: 60 MG/DL
HGB BLD-MCNC: 16 G/DL (ref 13.2–17.1)
LDLC SERPL CALC-MCNC: 169 MG/DL (CALC)
LPA SERPL-SCNC: <10 NMOL/L
MCH RBC QN AUTO: 32.3 PG (ref 27–33)
MCHC RBC AUTO-ENTMCNC: 35.2 G/DL (ref 32–36)
MCV RBC AUTO: 91.9 FL (ref 80–100)
NONHDLC SERPL-MCNC: 216 MG/DL (CALC)
PLATELET # BLD AUTO: 238 THOUSAND/UL (ref 140–400)
PMV BLD REES-ECKER: 11.7 FL (ref 7.5–12.5)
POTASSIUM SERPL-SCNC: 3.9 MMOL/L (ref 3.5–5.3)
PROT SERPL-MCNC: 7.4 G/DL (ref 6.1–8.1)
RBC # BLD AUTO: 4.95 MILLION/UL (ref 4.2–5.8)
SODIUM SERPL-SCNC: 137 MMOL/L (ref 135–146)
TRIGL SERPL-MCNC: 287 MG/DL
WBC # BLD AUTO: 7.1 THOUSAND/UL (ref 3.8–10.8)

## 2025-05-01 ENCOUNTER — APPOINTMENT (OUTPATIENT)
Dept: CARDIOLOGY | Facility: CLINIC | Age: 50
End: 2025-05-01
Payer: COMMERCIAL

## 2025-05-01 VITALS
WEIGHT: 227 LBS | HEART RATE: 68 BPM | DIASTOLIC BLOOD PRESSURE: 90 MMHG | HEIGHT: 72 IN | OXYGEN SATURATION: 97 % | BODY MASS INDEX: 30.75 KG/M2 | SYSTOLIC BLOOD PRESSURE: 139 MMHG

## 2025-05-01 DIAGNOSIS — I48.0 PAROXYSMAL ATRIAL FIBRILLATION (MULTI): Primary | ICD-10-CM

## 2025-05-01 DIAGNOSIS — I10 ESSENTIAL (PRIMARY) HYPERTENSION: ICD-10-CM

## 2025-05-01 DIAGNOSIS — E78.01 FAMILIAL HYPERCHOLESTEROLEMIA: ICD-10-CM

## 2025-05-01 LAB
ATRIAL RATE: 68 BPM
P AXIS: 38 DEGREES
P OFFSET: 181 MS
P ONSET: 122 MS
PR INTERVAL: 196 MS
Q ONSET: 220 MS
QRS COUNT: 11 BEATS
QRS DURATION: 92 MS
QT INTERVAL: 406 MS
QTC CALCULATION(BAZETT): 431 MS
QTC FREDERICIA: 423 MS
R AXIS: -16 DEGREES
T AXIS: 71 DEGREES
T OFFSET: 423 MS
VENTRICULAR RATE: 68 BPM

## 2025-05-01 PROCEDURE — 99214 OFFICE O/P EST MOD 30 MIN: CPT | Performed by: INTERNAL MEDICINE

## 2025-05-01 PROCEDURE — 3075F SYST BP GE 130 - 139MM HG: CPT | Performed by: INTERNAL MEDICINE

## 2025-05-01 PROCEDURE — 93005 ELECTROCARDIOGRAM TRACING: CPT | Performed by: INTERNAL MEDICINE

## 2025-05-01 PROCEDURE — 3080F DIAST BP >= 90 MM HG: CPT | Performed by: INTERNAL MEDICINE

## 2025-05-01 PROCEDURE — 3008F BODY MASS INDEX DOCD: CPT | Performed by: INTERNAL MEDICINE

## 2025-05-01 RX ORDER — ROSUVASTATIN CALCIUM 40 MG/1
40 TABLET, COATED ORAL DAILY
Qty: 30 TABLET | Refills: 11 | Status: SHIPPED | OUTPATIENT
Start: 2025-05-01 | End: 2026-05-01

## 2025-05-01 ASSESSMENT — PAIN SCALES - GENERAL: PAINLEVEL_OUTOF10: 0-NO PAIN

## 2025-05-01 NOTE — PROGRESS NOTES
Chief Complaint:   Follow-up     History Of Present Illness:    Jacinto Neri is a 50 y.o. male presenting with  a pertient medical Hx notable for adjustment insomnia, dyslipidemia, hypertension, paroxysmal atrial fibrillation who presents to cardiology for follow up care.     Recall that he had been in his usual state of health. He was getting ready for work when he began to experience intermittent heart palpitations associated with this was a fogginess to his brain, he went to work, where his supervisor felt that he is unwell. EMS was summoned was noted to have hypertensive urgency  For his atrial fibrillation. He was subsequently referred to [facility] were seen in the ER. He received IV labetalol for both blood pressure and rate control. He subsequently converted to normal sinus rhythm and maintained rhythm. He was started on apixaban 5 mg twice daily. He was continued on verapamil 360 mg daily, hydrochlorothiazide 25 mg, spironolactone 25 mg. He was discharged with event monitor..      Since his discharge, he has been doing well/notes of blood pressure has been better controlled. He notes a heart palpitations has also been better controlled. He has reduced and limiting nicotine and caffeine use which she feels has been helpful for him. He voices no additional complaints     Today ( 11/18/2022) he returns for follow up. He has just returned from a family vacation in [facility]. He was noticing that his legs have had a little more irritation and burn to them. Because of symptoms, he stopped amlodipine he feels that maybe his symptoms are related to amlodipine as he thinks the pill became bigger and changed. After this he began to have some of his discomfort.      5/18/2023 -- He returns for follow up. He has not had significant heart issues since the last seen.    11/13 /2023  - He returns for follow up. He has been havig some chest discomfor that can occur at rest or exertion.  He has been  "intermittently compliant with his blood pressure medications.     11/4/2024 -- He returns for follow up. It has been a year since he was last seen.  Notes that he has been keeping busy. He has not had any protracted heart palpitations in the recent brandon e and when he gets them, only fleeting at times. He notes that his blood pressures have been higher than normal He has also had some difficulty sleeping,     11/22/2024 --he returns for follow-up.  He notes that his blood pressure continues to be elevated.  He has been trying diet, exercise.  He also notes that his cholesterol level has increased as well.  He completed his echocardiogram and returns for the results. He was also able to complete the testing ordered.       No exacerbating or relieving factors.  Patient denies chest pain and angina.  Pt denies shortness of breath, dyspnea on exertion, orthopnea, and paroxysmal nocturnal dyspnea.  Pt denies worsening lower extremity edema.  Pt denies palpitations or syncope.  No recent falls.  No fever or chills.  No cough.  No change in bowel or bladder habits.  No sick contacts.  No recent travel.       Last Recorded Vitals:  Vitals:    05/01/25 0852   BP: 139/90   Patient Position: Sitting   Pulse: 68   SpO2: 97%   Weight: 103 kg (227 lb)   Height: 1.816 m (5' 11.5\")           Past Medical History:  He has a past medical history of Blepharitis of left upper eyelid, Blepharitis of right upper eyelid, and Refractive error.    Past Surgical History:  He has a past surgical history that includes Cardiac catheterization (06/25/2013) and Other surgical history (06/25/2013).      Social History:  He reports that he quit smoking about 28 years ago. His smoking use included cigarettes. His smokeless tobacco use includes chew. He reports current alcohol use of about 5.0 standard drinks of alcohol per week. He reports that he does not use drugs.    Family History:  Family History   Problem Relation Name Age of Onset    Insomnia " "Mother      Snoring Father          Allergies:  Lisinopril    Outpatient Medications:  Current Outpatient Medications   Medication Instructions    hydroCHLOROthiazide (HYDRODIURIL) 25 mg, oral, Daily    metoprolol succinate XL (TOPROL-XL) 25 mg, oral, Daily    nitroglycerin (NITROSTAT) 0.4 mg, sublingual, Every 5 min PRN, May repeat dose every 5 minutes for up to 3 doses total.    olmesartan (BENICAR) 10 mg, oral, Daily    rivaroxaban (XARELTO) 20 mg, oral, Daily    rosuvastatin (CRESTOR) 40 mg, oral, Daily    verapamil ER (VERALAN PM) 360 mg, oral, Daily       Physical Exam:  /90 (Patient Position: Sitting)   Pulse 68   Ht 1.816 m (5' 11.5\")   Wt 103 kg (227 lb)   SpO2 97%   BMI 31.22 kg/m²   /90 (Patient Position: Sitting)   Pulse 68   Ht 1.816 m (5' 11.5\")   Wt 103 kg (227 lb)   SpO2 97%   BMI 31.22 kg/m²   General:  Patient is awake, alert, and oriented.  Patient is in no acute distress.  HEENT:  Pupils equal and reactive.  Normocephalic.  Moist mucosa.    Neck:  No thyromegaly.  Normal Jugular Venous Pressure.  Cardiovascular:  Regular rate and rhythm.  Normal S1 and S2.  1/6 TODD.  Pulmonary:  Clear to auscultation bilaterally.  Abdomen:  Soft. Non-tender.   Non-distended.  Positive bowel sounds.  Lower Extremities:  2+ pedal pulses. No LE edema.  Neurologic:  Cranial nerves intact.  No focal deficit.   Skin: Skin warm and dry, normal skin turgor.   Psychiatric: Normal affect.            BC -  Lab Results   Component Value Date    WBC 7.1 04/22/2025    HGB 16.0 04/22/2025    HCT 45.5 04/22/2025    MCV 91.9 04/22/2025     04/22/2025       CMP -  Lab Results   Component Value Date    CALCIUM 9.7 04/22/2025    PROT 7.4 04/22/2025    ALBUMIN 4.5 04/22/2025    AST 14 04/22/2025    ALT 16 04/22/2025    ALKPHOS 61 04/22/2025    BILITOT 0.9 04/22/2025       LIPID PANEL -   Lab Results   Component Value Date    CHOL 276 (H) 04/22/2025    TRIG 287 (H) 04/22/2025    HDL 60 04/22/2025    CHHDL " 4.6 04/22/2025    VLDL 36 11/04/2024    NHDL 216 (H) 04/22/2025       RENAL FUNCTION PANEL -   Lab Results   Component Value Date    GLUCOSE 97 04/22/2025     04/22/2025    K 3.9 04/22/2025     04/22/2025    CO2 26 04/22/2025    ANIONGAP 9 04/22/2025    BUN 20 04/22/2025    CREATININE 0.90 04/22/2025    GFRMALE >90 04/13/2022    CALCIUM 9.7 04/22/2025    ALBUMIN 4.5 04/22/2025        Lab Results   Component Value Date    BNP 13 04/13/2022    HGBA1C 5.1 02/08/2019       Last Cardiology Tests:  Echocardiogram 11/15/2024  CONCLUSIONS:   1. Left ventricular ejection fraction is normal, calculated by Davis's biplane at 57%.   2. There is normal right ventricular global systolic function.      In office EKG  -- Sinus rhythm 71 bpm  -- Q waves present V1 through V3     Event monitor 04/2022  [employer] 14 day holter monitor    rhythm sinus bradycardia to sinus tachycardia with occurrence of first-degree AV block.  Minimum heart rate 57, maximum heart rate 132, average heart rate 77  954 PVCs burden 0.07%, 70 2P SVC's burden of less than 0.01%, 16 patient reported events correlating with sinus rhythm no additional atrial fibrillation seen.         Lab review: I have personally reviewed the laboratory result(s)   Diagnostic review: I have personally reviewed the result(s) of the EKG and Echocardiogram .   Reports very poor sleep.  Assessment/Plan   Problem List Items Addressed This Visit       Essential (primary) hypertension    Overview   He has documented angioedema to ACE inhibitors  He had myalgia related to use of Amlodipine-- Stopped             Current Assessment & Plan   In Office  -- Initially 174/ 101 that reduces to 146/98 at the end of the appointment.  This still been difficult historically to control  -- We will obtain a transthoracic echocardiogram for structural evaluation including ejection fraction, assessment of regional wall motion abnormalities or valvular disease, and further  evaluation of hemodynamics.  -- May benefir from ARB, especially if echocardiogram shows LVH  -- May benefit from more effective diuretic if RVSP or IVC elevated / Dilated   Olmesartan Rx'd for Blood pressure control   -- Although allergy to Lisinopril is documented as Angioedema, current data suggests risk of Cross Reaction much lower than previously describe 1.5 - 10% risk   -- In a meta-analysis of data from 19 trials, the overall incidence of ??gi?edema with ?RBs was not significantly different from placebo. This analysis included 35,479 patients (mean age 61 years; 59 percent men) of whom 52 developed ??gi???em? during a mean duration of 120 weeks, for a weighted incidence of 0.11 percent (95% CI 0.09-0.13)  Binu H, Rafael FH, Juan J, et al. [place]-analysis of randomized trials of angioedema as an adverse event of renin-angiotensin system inhibitors. Am J Cardiol 2012; 110:383.  -- He has been tolerant of the Olmesartan, breaking His dosing into 5 more milligrams in the morning, 5 mg in the evening.   -- He does remain compliant with hydrochlorothiazide, metoprolol, verapamil in the morning which has been helpful for controlling blood pressure at work.  As such, continue current regimen and dosing as he has been successful with.           Relevant Orders    Comprehensive metabolic panel    Hyperlipemia    Overview   The ASCVD Risk score (Raheem DK, et al., 2019) failed to calculate for the following reasons:    Risk score cannot be calculated because patient has a medical history suggesting prior/existing ASCVD  Lab Results   Component Value Date    CHOL 276 (H) 04/22/2025    CHOL 213 (H) 11/04/2024    CHOL 190 02/08/2019     Lab Results   Component Value Date    HDL 60 04/22/2025    HDL 52.8 11/04/2024    HDL 47 02/08/2019     Lab Results   Component Value Date    LDLCALC 169 (H) 04/22/2025    LDLCALC 124 (H) 11/04/2024    LDLCALC 127 02/08/2019     Lab Results   Component Value Date    TRIG 287 (H)  "04/22/2025    TRIG 180 (H) 11/04/2024    TRIG 80 02/08/2019     No components found for: \"CHOLHDL\"           Current Assessment & Plan   Repeat Blood Work for risk stratification  remains sub optimal   -- Begin Rosuvastatin 40 mg   -- Continue to try and address healthy diet ( he acknowledges this can be better)              Relevant Medications    rosuvastatin (Crestor) 40 mg tablet    Other Relevant Orders    Lipid panel    Lipoprotein a    Comprehensive metabolic panel    Paroxysmal atrial fibrillation (Multi) - Primary    Overview   Initial Event 04/2022  [id number] Stroke Risk Points: 2   Values used to calculate this score:    Points  Metrics       0        Has Congestive Heart Failure: No       1        Has Hypertension: Yes       0        Age: 50       0        Has Diabetes: No       0        Had Stroke: No                 Had TIA: No                 Had Thromboembolism: No       1        Has Vascular Disease: Yes       0        Clinically Relevant Sex: Male  --Continue Apixaban at this time   -- Continue Verapamil / Metoprolol for Rate / Rhythm control         Current Assessment & Plan   5/1/2025 --> Remains stable.   --  Continued current medications          Relevant Orders    ECG 12 lead (Clinic Performed)    Comprehensive metabolic panel        Charanjit Palmer DO   Division of Cardiovascular Medicine  [facility]        "

## 2025-05-01 NOTE — PATIENT INSTRUCTIONS
"It was a pleasure seeing you today. I would like to see you back in clinic in April. You can call my office if questions arise between now and our next visit.     Today, we talked about your atrial fibrillation and your EKG   --So Far, you have remained without evidence of Atrial Fibrillation         We talked about your weight trend.   -- The Weight is   Vitals:    05/01/25 0852   Weight: 103 kg (227 lb)    pounds today.  ( 5/1/2025 )   -- Write your weight down on a calendar each week.   -- Weigh yourself on THURSDAYS. Your goal is to lose 1/2 to 1# PER WEEK!!  -- Your goal is one pound per week.  1 pound is 3500 kCal  ++ shoot to cur 500 kCal / Day   ++ eat 500 kCal Less per day and try and exercise to burn more kCal.   --  your pace each week on your walk.   Ideally, the more weight you are able to lose, the greater the cardiovascular benefits!   Usually, for every 1 pound of weight loss, we generally see 1mmHg decrease in her overall blood pressure. Depending on how much weight is lost, we are sometimes able to reduce or completely stop blood pressure medications.          Below are some Heart Health Tips that we provide to all of our patients. I hope you fing them useful.     - If you are having problems with medications, consider looking at the following websites.   --  \"GoodRx\"   --  \"[facility]\"      - We are happy to supply written prescriptions if needed to allow you to obtain your medications from different pharmacies. Additionally, if you are having issues with mail order delivery, please let us know. We can send a limited supply of your medications to your local pharmacy.     -  We recommend you follow a heart healthy diet. Watch food labels and try not to eat more than 2,500 mg of sodium per day. Avoid foods high in salt like processed meats (lunch meats, parham, and sausage), processed foods (boxed dinners, canned soups), fried and fast foods. Monitor serving sizes and if " the sodium per serving size is more than 200 mg, avoid those foods. If the sodium per serving size is between 100-200 mg, you can use those in limited quantities. Try to choose foods where the amount of sodium per serving size is less than 100 mg. Try to eat a diet rich in fruits and vegetables, whole grains, low fat dairy products, skinless poultry and fish, nuts, beans, non-tropical vegetable oils. Limit saturated fat, trans fat, sodium, red meats, and sugar-sweetened beverages.   Limit alcohol     -The combination of a reduced-calorie diet and increased physical activity is recommended. Adults should aim to get at least 150 minutes of moderate physical activity per week (30 minutes of moderate physical activities at least 5 days per week). Examples of moderate physical activities include brisk walking, swimming, aerobic dancing, heavy gardening, jumping rope, bicycling 10 MPH or faster, tennis, hiking uphill or with a heavy backpack. Please let us know if you would like to learn more about your nutrition and calories and additional options including weight loss programs to help you reach your goal.     -If you smoke, stop smoking. If you stop smoking you can help get rid of a major source of stress to your heart. Smoking makes your heart rate and blood pressure go up and increases your risk or developing cardiovascular diseases and worsen symptoms associated with heart failure.     -Obtain a BP monitor and monitor your BP daily. Check it around the same time each day; at least 1 hour after taking your medications. Record your BP in a log and bring your log with you to your doctors appointment.     -F/u with your PCP as recommended.

## 2025-05-01 NOTE — ASSESSMENT & PLAN NOTE
In Office  -- Initially 174/ 101 that reduces to 146/98 at the end of the appointment.  This still been difficult historically to control  -- We will obtain a transthoracic echocardiogram for structural evaluation including ejection fraction, assessment of regional wall motion abnormalities or valvular disease, and further evaluation of hemodynamics.  -- May benefir from ARB, especially if echocardiogram shows LVH  -- May benefit from more effective diuretic if RVSP or IVC elevated / Dilated   Olmesartan Rx'd for Blood pressure control   -- Although allergy to Lisinopril is documented as Angioedema, current data suggests risk of Cross Reaction much lower than previously describe 1.5 - 10% risk   -- In a meta-analysis of data from 19 trials, the overall incidence of ??gi?edema with ?RBs was not significantly different from placebo. This analysis included 35,479 patients (mean age 61 years; 59 percent men) of whom 52 developed ??gi???em? during a mean duration of 120 weeks, for a weighted incidence of 0.11 percent (95% CI 0.09-0.13)  Binu H, Rafael RICO, Juan J, et al. [place]-analysis of randomized trials of angioedema as an adverse event of renin-angiotensin system inhibitors. Am J Cardiol 2012; 110:383.  -- He has been tolerant of the Olmesartan, breaking His dosing into 5 more milligrams in the morning, 5 mg in the evening.   -- He does remain compliant with hydrochlorothiazide, metoprolol, verapamil in the morning which has been helpful for controlling blood pressure at work.  As such, continue current regimen and dosing as he has been successful with.

## 2025-05-01 NOTE — ASSESSMENT & PLAN NOTE
Repeat Blood Work for risk stratification  remains sub optimal   -- Begin Rosuvastatin 40 mg   -- Continue to try and address healthy diet ( he acknowledges this can be better)

## 2025-06-12 NOTE — PROGRESS NOTES
"Subjective   Patient ID: Tank Neri \"Jacinto\" is a 50 y.o. male    HPI  50 y.o. male who presents to establish for BPH. Patient reports urinary urgency and hesitation. He notes a weak urinary stream.     Patient reports a history of kidney stone requiring surgical intervention.    The most recent Renal US, conducted on 01/19/2012, revealed:  Suggestion of small subcentimeter nonobstructing bilateral renal calculi as  above. No evidence for hydronephrosis or solid masses.  Right renal cyst.      Review of Systems    All systems were reviewed. Anything negative was noted in the HPI.    Objective   Physical Exam    General: Well developed, well nourished, alert and cooperative, appears in no acute distress   Eyes: Non-injected conjunctiva, sclera clear, no proptosis   Cardiac: Extremities are warm and well perfused. No edema, cyanosis or pallor   Lungs: Breathing is easy, non-labored. Speaking in clear and complete sentences. Normal diaphragmatic movement   MSK: Ambulatory with steady gait, unassisted   Neuro: Alert and oriented to person, place, and time   Psych: Demonstrates good judgment and reason, without hallucinations, abnormal affect or abnormal behaviors   Skin: No obvious lesions, no rashes       No CVA tenderness bilaterally   No suprapubic pain or discomfort       Medical History[1]      Surgical History[2]      Assessment/Plan   BPH    50 y.o. male who presents for the above condition, Today, we had a very long and extensive discussion with the patient regarding the pathophysiology, differential diagnosis, risk factor, associated condition, diagnostic work-up and management of BPH and lower urinary tract symptoms and acute urinary retention. I discussed with the patient the need to check his PSA to assess his prostate cancer risk. We discussed at length the mechanism of action, risk, benefit, adverse events and side effect of alpha-blocker in the form of tamsulosin 0.4 mg p.o nightly. We discussed in " particular the risk of hypotension, lightheadedness, dizziness, and the risk of fall and bone fracture. Also discussed retrograde ejaculation of the side effects of the medication. We had another discussion with the patient regarding lifestyle modifications including low fluid intake after 5 PM, timed voiding every 2 hours, and decrease caffeine intake. I discussed with the patient that in case he had an elevated PSA, we will proceed do an MRI of the prostate.        - Avoid caffeine entirely     - Implement a timed voiding schedule (every 2 to 2.5 hours).    - Discussed the importance of controlling fluid intake and avoiding caffeine.    - Advised on the potential need for cystoscopy if symptoms persist.  - Pt reports he is not currently taking nitroglycerin, noting it is as needed.  - Advised pt to never taking Cialis and nitroglycerin together due to the major medical side effect.     Plan:  - Renal US  - PSA today  - Follow-up in 4 months with results  - Prescribed Cialis 5 mg daily      E&M visit today is associated with current or anticipated ongoing medical care services related to a patient's single, serious condition or a complex condition.     6/17/2025    Scribe Attestation  By signing my name below, I, Alice Day, Sue attest that this documentation has been prepared under the direction and in the presence of Dr. Junior Vazquez.        [1]   Past Medical History:  Diagnosis Date    Blepharitis of left upper eyelid     Blepharitis of right upper eyelid     Refractive error    [2]   Past Surgical History:  Procedure Laterality Date    CARDIAC CATHETERIZATION  06/25/2013    Cardiac Cath Procedure Summary    OTHER SURGICAL HISTORY  06/25/2013    Surgery

## 2025-06-17 ENCOUNTER — APPOINTMENT (OUTPATIENT)
Dept: UROLOGY | Facility: HOSPITAL | Age: 50
End: 2025-06-17
Payer: COMMERCIAL

## 2025-06-17 DIAGNOSIS — N40.0 BPH WITHOUT OBSTRUCTION/LOWER URINARY TRACT SYMPTOMS: Primary | ICD-10-CM

## 2025-06-17 PROCEDURE — 99214 OFFICE O/P EST MOD 30 MIN: CPT | Performed by: STUDENT IN AN ORGANIZED HEALTH CARE EDUCATION/TRAINING PROGRAM

## 2025-06-17 PROCEDURE — 99204 OFFICE O/P NEW MOD 45 MIN: CPT | Performed by: STUDENT IN AN ORGANIZED HEALTH CARE EDUCATION/TRAINING PROGRAM

## 2025-06-17 RX ORDER — TADALAFIL 5 MG/1
5 TABLET ORAL DAILY
Qty: 30 TABLET | Refills: 6 | Status: SHIPPED | OUTPATIENT
Start: 2025-06-17 | End: 2025-06-19 | Stop reason: SDUPTHER

## 2025-06-18 ENCOUNTER — PATIENT MESSAGE (OUTPATIENT)
Dept: UROLOGY | Facility: HOSPITAL | Age: 50
End: 2025-06-18
Payer: COMMERCIAL

## 2025-06-18 LAB — PSA SERPL-MCNC: 1.37 NG/ML

## 2025-06-19 DIAGNOSIS — N40.0 BPH WITHOUT OBSTRUCTION/LOWER URINARY TRACT SYMPTOMS: Primary | ICD-10-CM

## 2025-06-19 DIAGNOSIS — I48.0 PAROXYSMAL ATRIAL FIBRILLATION (MULTI): ICD-10-CM

## 2025-06-19 RX ORDER — TADALAFIL 5 MG/1
5 TABLET ORAL DAILY
Qty: 30 TABLET | Refills: 6 | Status: SHIPPED | OUTPATIENT
Start: 2025-06-19 | End: 2025-07-19

## 2025-06-19 NOTE — TELEPHONE ENCOUNTER
LOV 25  F/U 25    Spoke with the pharmacy who stated that they continued to fill this med from the script they received 24 (even though it was d/c'd 24 and reordered) and now the script you sent 24 is . All his other cardiac meds have fills still.

## 2025-06-27 ENCOUNTER — APPOINTMENT (OUTPATIENT)
Dept: RADIOLOGY | Facility: HOSPITAL | Age: 50
End: 2025-06-27
Payer: COMMERCIAL

## 2025-06-30 ENCOUNTER — APPOINTMENT (OUTPATIENT)
Dept: RADIOLOGY | Facility: HOSPITAL | Age: 50
End: 2025-06-30
Payer: COMMERCIAL

## 2025-06-30 DIAGNOSIS — N40.0 BPH WITHOUT OBSTRUCTION/LOWER URINARY TRACT SYMPTOMS: ICD-10-CM

## 2025-06-30 PROCEDURE — 76770 US EXAM ABDO BACK WALL COMP: CPT | Performed by: RADIOLOGY

## 2025-06-30 PROCEDURE — 76770 US EXAM ABDO BACK WALL COMP: CPT

## 2025-07-30 ENCOUNTER — E-ADVICE (OUTPATIENT)
Dept: CARDIOLOGY | Age: 50
End: 2025-07-30

## 2025-07-30 DIAGNOSIS — I10 ESSENTIAL HYPERTENSION: ICD-10-CM

## 2025-07-30 RX ORDER — IRBESARTAN 150 MG/1
150 TABLET ORAL DAILY
Qty: 90 TABLET | Refills: 3 | Status: SHIPPED | OUTPATIENT
Start: 2025-07-30

## 2025-08-30 LAB
ALBUMIN SERPL-MCNC: 4.4 G/DL (ref 3.6–5.1)
ALP SERPL-CCNC: 52 U/L (ref 35–144)
ALT SERPL-CCNC: 22 U/L (ref 9–46)
ANION GAP SERPL CALCULATED.4IONS-SCNC: 10 MMOL/L (CALC) (ref 7–17)
AST SERPL-CCNC: 16 U/L (ref 10–35)
BILIRUB SERPL-MCNC: 0.7 MG/DL (ref 0.2–1.2)
BUN SERPL-MCNC: 18 MG/DL (ref 7–25)
CALCIUM SERPL-MCNC: 9.9 MG/DL (ref 8.6–10.3)
CHLORIDE SERPL-SCNC: 103 MMOL/L (ref 98–110)
CHOLEST SERPL-MCNC: 105 MG/DL
CHOLEST/HDLC SERPL: 1.9 (CALC)
CO2 SERPL-SCNC: 28 MMOL/L (ref 20–32)
CREAT SERPL-MCNC: 1.02 MG/DL (ref 0.7–1.3)
EGFRCR SERPLBLD CKD-EPI 2021: 89 ML/MIN/1.73M2
GLUCOSE SERPL-MCNC: 87 MG/DL (ref 65–139)
HDLC SERPL-MCNC: 55 MG/DL
LDLC SERPL CALC-MCNC: 32 MG/DL (CALC)
LPA SERPL-SCNC: NORMAL NMOL/L
NONHDLC SERPL-MCNC: 50 MG/DL (CALC)
POTASSIUM SERPL-SCNC: 4.2 MMOL/L (ref 3.5–5.3)
PROT SERPL-MCNC: 7.3 G/DL (ref 6.1–8.1)
SODIUM SERPL-SCNC: 141 MMOL/L (ref 135–146)
TRIGL SERPL-MCNC: 101 MG/DL

## 2025-09-02 ENCOUNTER — APPOINTMENT (OUTPATIENT)
Dept: CARDIOLOGY | Facility: CLINIC | Age: 50
End: 2025-09-02
Payer: COMMERCIAL

## 2025-09-02 LAB
ALBUMIN SERPL-MCNC: 4.4 G/DL (ref 3.6–5.1)
ALP SERPL-CCNC: 52 U/L (ref 35–144)
ALT SERPL-CCNC: 22 U/L (ref 9–46)
ANION GAP SERPL CALCULATED.4IONS-SCNC: 10 MMOL/L (CALC) (ref 7–17)
AST SERPL-CCNC: 16 U/L (ref 10–35)
BILIRUB SERPL-MCNC: 0.7 MG/DL (ref 0.2–1.2)
BUN SERPL-MCNC: 18 MG/DL (ref 7–25)
CALCIUM SERPL-MCNC: 9.9 MG/DL (ref 8.6–10.3)
CHLORIDE SERPL-SCNC: 103 MMOL/L (ref 98–110)
CHOLEST SERPL-MCNC: 105 MG/DL
CHOLEST/HDLC SERPL: 1.9 (CALC)
CO2 SERPL-SCNC: 28 MMOL/L (ref 20–32)
CREAT SERPL-MCNC: 1.02 MG/DL (ref 0.7–1.3)
EGFRCR SERPLBLD CKD-EPI 2021: 89 ML/MIN/1.73M2
GLUCOSE SERPL-MCNC: 87 MG/DL (ref 65–139)
HDLC SERPL-MCNC: 55 MG/DL
LDLC SERPL CALC-MCNC: 32 MG/DL (CALC)
LPA SERPL-SCNC: <10 NMOL/L
NONHDLC SERPL-MCNC: 50 MG/DL (CALC)
POTASSIUM SERPL-SCNC: 4.2 MMOL/L (ref 3.5–5.3)
PROT SERPL-MCNC: 7.3 G/DL (ref 6.1–8.1)
SODIUM SERPL-SCNC: 141 MMOL/L (ref 135–146)
TRIGL SERPL-MCNC: 101 MG/DL

## 2025-09-02 ASSESSMENT — ENCOUNTER SYMPTOMS
DEPRESSION: 0
OCCASIONAL FEELINGS OF UNSTEADINESS: 0
LOSS OF SENSATION IN FEET: 0

## 2025-09-02 ASSESSMENT — PAIN SCALES - GENERAL: PAINLEVEL_OUTOF10: 0-NO PAIN

## 2026-06-02 ENCOUNTER — APPOINTMENT (OUTPATIENT)
Dept: CARDIOLOGY | Facility: CLINIC | Age: 51
End: 2026-06-02
Payer: COMMERCIAL